# Patient Record
Sex: FEMALE | Race: WHITE | NOT HISPANIC OR LATINO | Employment: OTHER | URBAN - METROPOLITAN AREA
[De-identification: names, ages, dates, MRNs, and addresses within clinical notes are randomized per-mention and may not be internally consistent; named-entity substitution may affect disease eponyms.]

---

## 2023-06-26 ENCOUNTER — HOSPITAL ENCOUNTER (EMERGENCY)
Facility: CLINIC | Age: 63
Discharge: HOME | End: 2023-06-27
Attending: EMERGENCY MEDICINE | Admitting: EMERGENCY MEDICINE
Payer: COMMERCIAL

## 2023-06-26 ENCOUNTER — APPOINTMENT (OUTPATIENT)
Dept: MRI IMAGING | Facility: CLINIC | Age: 63
End: 2023-06-26
Attending: EMERGENCY MEDICINE
Payer: COMMERCIAL

## 2023-06-26 DIAGNOSIS — S22.050A COMPRESSION FRACTURE OF T6 VERTEBRA, INITIAL ENCOUNTER (H): ICD-10-CM

## 2023-06-26 DIAGNOSIS — S32.010A CLOSED COMPRESSION FRACTURE OF L1 LUMBAR VERTEBRA, INITIAL ENCOUNTER (H): ICD-10-CM

## 2023-06-26 PROCEDURE — 72146 MRI CHEST SPINE W/O DYE: CPT

## 2023-06-26 PROCEDURE — 99284 EMERGENCY DEPT VISIT MOD MDM: CPT | Mod: 25

## 2023-06-26 PROCEDURE — 72148 MRI LUMBAR SPINE W/O DYE: CPT

## 2023-06-26 PROCEDURE — 250N000013 HC RX MED GY IP 250 OP 250 PS 637: Performed by: EMERGENCY MEDICINE

## 2023-06-26 RX ORDER — ONDANSETRON 2 MG/ML
4 INJECTION INTRAMUSCULAR; INTRAVENOUS EVERY 30 MIN PRN
Status: DISCONTINUED | OUTPATIENT
Start: 2023-06-26 | End: 2023-06-27 | Stop reason: HOSPADM

## 2023-06-26 RX ORDER — OXYCODONE HYDROCHLORIDE 5 MG/1
10 TABLET ORAL ONCE
Status: COMPLETED | OUTPATIENT
Start: 2023-06-26 | End: 2023-06-26

## 2023-06-26 RX ADMIN — OXYCODONE HYDROCHLORIDE 10 MG: 5 TABLET ORAL at 21:55

## 2023-06-26 ASSESSMENT — ACTIVITIES OF DAILY LIVING (ADL): ADLS_ACUITY_SCORE: 35

## 2023-06-26 ASSESSMENT — ENCOUNTER SYMPTOMS
NUMBNESS: 0
BACK PAIN: 1

## 2023-06-27 VITALS
RESPIRATION RATE: 16 BRPM | DIASTOLIC BLOOD PRESSURE: 81 MMHG | OXYGEN SATURATION: 97 % | WEIGHT: 203 LBS | HEART RATE: 65 BPM | SYSTOLIC BLOOD PRESSURE: 145 MMHG | HEIGHT: 64 IN | BODY MASS INDEX: 34.66 KG/M2 | TEMPERATURE: 98 F

## 2023-06-27 PROCEDURE — 96372 THER/PROPH/DIAG INJ SC/IM: CPT | Performed by: EMERGENCY MEDICINE

## 2023-06-27 PROCEDURE — 250N000011 HC RX IP 250 OP 636: Performed by: EMERGENCY MEDICINE

## 2023-06-27 RX ORDER — HYDROMORPHONE HYDROCHLORIDE 1 MG/ML
0.5 INJECTION, SOLUTION INTRAMUSCULAR; INTRAVENOUS; SUBCUTANEOUS ONCE
Status: COMPLETED | OUTPATIENT
Start: 2023-06-27 | End: 2023-06-27

## 2023-06-27 RX ORDER — ONDANSETRON 4 MG/1
4 TABLET, ORALLY DISINTEGRATING ORAL EVERY 8 HOURS PRN
Qty: 10 TABLET | Refills: 0 | Status: SHIPPED | OUTPATIENT
Start: 2023-06-27

## 2023-06-27 RX ORDER — SENNA AND DOCUSATE SODIUM 50; 8.6 MG/1; MG/1
1 TABLET, FILM COATED ORAL 2 TIMES DAILY PRN
Qty: 20 TABLET | Refills: 0 | Status: ON HOLD | OUTPATIENT
Start: 2023-06-27 | End: 2024-06-14

## 2023-06-27 RX ORDER — ONDANSETRON 4 MG/1
4 TABLET, ORALLY DISINTEGRATING ORAL ONCE
Status: COMPLETED | OUTPATIENT
Start: 2023-06-27 | End: 2023-06-27

## 2023-06-27 RX ORDER — OXYCODONE HYDROCHLORIDE 5 MG/1
5 TABLET ORAL EVERY 6 HOURS PRN
Qty: 16 TABLET | Refills: 0 | Status: SHIPPED | OUTPATIENT
Start: 2023-06-27 | End: 2023-06-30

## 2023-06-27 RX ADMIN — ONDANSETRON 4 MG: 4 TABLET, ORALLY DISINTEGRATING ORAL at 00:42

## 2023-06-27 RX ADMIN — HYDROMORPHONE HYDROCHLORIDE 0.5 MG: 1 INJECTION, SOLUTION INTRAMUSCULAR; INTRAVENOUS; SUBCUTANEOUS at 00:42

## 2023-06-27 ASSESSMENT — ACTIVITIES OF DAILY LIVING (ADL): ADLS_ACUITY_SCORE: 35

## 2023-06-27 NOTE — ED NOTES
Started Thursday morning while helping to transfer disabled sister.  Patient heard and felt a pop in her lower back.  She iced it, took aleve, and put on an antiinflammatory gel and rested.  Low back is now feels ok but the pain has migrated up her back,  She did take baclofen at 1500 today and it did relieve some pain.  Patient states she has osteopenia

## 2023-06-27 NOTE — ED TRIAGE NOTES
Patient reports pain in Thoracic area of her back, pain radiates across shoulders. Reports transferring a person out of a wheel chair on 6/22 and immediately left low back pain. On 6/24 woke up with upper back pain that has progressively gotten worse. Has been taking aleve and using ice. Took a muscle relaxer this evening which helped until her sneezed.      Triage Assessment     Row Name 06/26/23 2003       Triage Assessment (Adult)    Airway WDL WDL       Respiratory WDL    Respiratory WDL WDL       Skin Circulation/Temperature WDL    Skin Circulation/Temperature WDL WDL       Cardiac WDL    Cardiac WDL WDL       Peripheral/Neurovascular WDL    Peripheral Neurovascular WDL WDL       Cognitive/Neuro/Behavioral WDL    Cognitive/Neuro/Behavioral WDL WDL

## 2023-08-05 ENCOUNTER — HEALTH MAINTENANCE LETTER (OUTPATIENT)
Age: 63
End: 2023-08-05

## 2024-06-09 ENCOUNTER — APPOINTMENT (OUTPATIENT)
Dept: ULTRASOUND IMAGING | Facility: CLINIC | Age: 64
End: 2024-06-09
Attending: EMERGENCY MEDICINE
Payer: COMMERCIAL

## 2024-06-09 ENCOUNTER — HOSPITAL ENCOUNTER (OUTPATIENT)
Facility: HOSPITAL | Age: 64
End: 2024-06-09
Attending: SURGERY | Admitting: SURGERY
Payer: COMMERCIAL

## 2024-06-09 ENCOUNTER — HOSPITAL ENCOUNTER (EMERGENCY)
Facility: CLINIC | Age: 64
Discharge: HOME | End: 2024-06-09
Attending: EMERGENCY MEDICINE | Admitting: EMERGENCY MEDICINE
Payer: COMMERCIAL

## 2024-06-09 VITALS
WEIGHT: 176 LBS | TEMPERATURE: 97.8 F | HEIGHT: 64 IN | DIASTOLIC BLOOD PRESSURE: 68 MMHG | SYSTOLIC BLOOD PRESSURE: 129 MMHG | RESPIRATION RATE: 18 BRPM | OXYGEN SATURATION: 97 % | BODY MASS INDEX: 30.05 KG/M2 | HEART RATE: 62 BPM

## 2024-06-09 DIAGNOSIS — K80.20 SYMPTOMATIC CHOLELITHIASIS: ICD-10-CM

## 2024-06-09 DIAGNOSIS — K81.9 CHOLECYSTITIS: Primary | ICD-10-CM

## 2024-06-09 LAB
ALBUMIN SERPL BCG-MCNC: 4.2 G/DL (ref 3.5–5.2)
ALBUMIN UR-MCNC: NEGATIVE MG/DL
ALP SERPL-CCNC: 93 U/L (ref 40–150)
ALT SERPL W P-5'-P-CCNC: 26 U/L (ref 0–50)
ANION GAP SERPL CALCULATED.3IONS-SCNC: 12 MMOL/L (ref 7–15)
APPEARANCE UR: CLEAR
AST SERPL W P-5'-P-CCNC: 22 U/L (ref 0–45)
BASOPHILS # BLD AUTO: 0 10E3/UL (ref 0–0.2)
BASOPHILS NFR BLD AUTO: 0 %
BILIRUB SERPL-MCNC: 0.4 MG/DL
BILIRUB UR QL STRIP: NEGATIVE
BUN SERPL-MCNC: 10.5 MG/DL (ref 8–23)
CALCIUM SERPL-MCNC: 9.1 MG/DL (ref 8.8–10.2)
CHLORIDE SERPL-SCNC: 104 MMOL/L (ref 98–107)
COLOR UR AUTO: ABNORMAL
CREAT SERPL-MCNC: 0.78 MG/DL (ref 0.51–0.95)
DEPRECATED HCO3 PLAS-SCNC: 23 MMOL/L (ref 22–29)
EGFRCR SERPLBLD CKD-EPI 2021: 85 ML/MIN/1.73M2
EOSINOPHIL # BLD AUTO: 0.1 10E3/UL (ref 0–0.7)
EOSINOPHIL NFR BLD AUTO: 1 %
ERYTHROCYTE [DISTWIDTH] IN BLOOD BY AUTOMATED COUNT: 12.2 % (ref 10–15)
GLUCOSE SERPL-MCNC: 103 MG/DL (ref 70–99)
GLUCOSE UR STRIP-MCNC: NEGATIVE MG/DL
HCT VFR BLD AUTO: 38.1 % (ref 35–47)
HGB BLD-MCNC: 13.2 G/DL (ref 11.7–15.7)
HGB UR QL STRIP: NEGATIVE
IMM GRANULOCYTES # BLD: 0 10E3/UL
IMM GRANULOCYTES NFR BLD: 0 %
KETONES UR STRIP-MCNC: NEGATIVE MG/DL
LEUKOCYTE ESTERASE UR QL STRIP: NEGATIVE
LIPASE SERPL-CCNC: 36 U/L (ref 13–60)
LYMPHOCYTES # BLD AUTO: 2.1 10E3/UL (ref 0.8–5.3)
LYMPHOCYTES NFR BLD AUTO: 20 %
MCH RBC QN AUTO: 30.1 PG (ref 26.5–33)
MCHC RBC AUTO-ENTMCNC: 34.6 G/DL (ref 31.5–36.5)
MCV RBC AUTO: 87 FL (ref 78–100)
MONOCYTES # BLD AUTO: 0.9 10E3/UL (ref 0–1.3)
MONOCYTES NFR BLD AUTO: 9 %
MUCOUS THREADS #/AREA URNS LPF: PRESENT /LPF
NEUTROPHILS # BLD AUTO: 7.5 10E3/UL (ref 1.6–8.3)
NEUTROPHILS NFR BLD AUTO: 71 %
NITRATE UR QL: NEGATIVE
NRBC # BLD AUTO: 0 10E3/UL
NRBC BLD AUTO-RTO: 0 /100
PH UR STRIP: 5 [PH] (ref 5–7)
PLATELET # BLD AUTO: 288 10E3/UL (ref 150–450)
POTASSIUM SERPL-SCNC: 3.9 MMOL/L (ref 3.4–5.3)
PROT SERPL-MCNC: 7 G/DL (ref 6.4–8.3)
RBC # BLD AUTO: 4.39 10E6/UL (ref 3.8–5.2)
RBC URINE: 0 /HPF
SODIUM SERPL-SCNC: 139 MMOL/L (ref 135–145)
SP GR UR STRIP: 1.01 (ref 1–1.03)
SQUAMOUS EPITHELIAL: <1 /HPF
UROBILINOGEN UR STRIP-MCNC: <2 MG/DL
WBC # BLD AUTO: 10.7 10E3/UL (ref 4–11)
WBC URINE: <1 /HPF

## 2024-06-09 PROCEDURE — 96361 HYDRATE IV INFUSION ADD-ON: CPT

## 2024-06-09 PROCEDURE — 99285 EMERGENCY DEPT VISIT HI MDM: CPT | Mod: 25

## 2024-06-09 PROCEDURE — 96375 TX/PRO/DX INJ NEW DRUG ADDON: CPT

## 2024-06-09 PROCEDURE — 85025 COMPLETE CBC W/AUTO DIFF WBC: CPT | Performed by: EMERGENCY MEDICINE

## 2024-06-09 PROCEDURE — 81001 URINALYSIS AUTO W/SCOPE: CPT | Performed by: EMERGENCY MEDICINE

## 2024-06-09 PROCEDURE — 250N000011 HC RX IP 250 OP 636: Mod: JZ | Performed by: EMERGENCY MEDICINE

## 2024-06-09 PROCEDURE — 96374 THER/PROPH/DIAG INJ IV PUSH: CPT

## 2024-06-09 PROCEDURE — 36415 COLL VENOUS BLD VENIPUNCTURE: CPT | Performed by: EMERGENCY MEDICINE

## 2024-06-09 PROCEDURE — 80053 COMPREHEN METABOLIC PANEL: CPT | Performed by: EMERGENCY MEDICINE

## 2024-06-09 PROCEDURE — 76705 ECHO EXAM OF ABDOMEN: CPT

## 2024-06-09 PROCEDURE — 258N000003 HC RX IP 258 OP 636: Mod: JZ | Performed by: EMERGENCY MEDICINE

## 2024-06-09 PROCEDURE — 83690 ASSAY OF LIPASE: CPT | Performed by: EMERGENCY MEDICINE

## 2024-06-09 RX ORDER — OXYCODONE HYDROCHLORIDE 5 MG/1
5 TABLET ORAL EVERY 6 HOURS PRN
Qty: 12 TABLET | Refills: 0 | Status: SHIPPED | OUTPATIENT
Start: 2024-06-09 | End: 2024-06-12

## 2024-06-09 RX ORDER — HYDROMORPHONE HYDROCHLORIDE 1 MG/ML
0.5 INJECTION, SOLUTION INTRAMUSCULAR; INTRAVENOUS; SUBCUTANEOUS ONCE
Status: COMPLETED | OUTPATIENT
Start: 2024-06-09 | End: 2024-06-09

## 2024-06-09 RX ORDER — SODIUM CHLORIDE, SODIUM LACTATE, POTASSIUM CHLORIDE, CALCIUM CHLORIDE 600; 310; 30; 20 MG/100ML; MG/100ML; MG/100ML; MG/100ML
INJECTION, SOLUTION INTRAVENOUS CONTINUOUS
Status: CANCELLED | OUTPATIENT
Start: 2024-06-10

## 2024-06-09 RX ORDER — ONDANSETRON 2 MG/ML
4 INJECTION INTRAMUSCULAR; INTRAVENOUS ONCE
Status: COMPLETED | OUTPATIENT
Start: 2024-06-09 | End: 2024-06-09

## 2024-06-09 RX ORDER — CEFAZOLIN SODIUM/WATER 2 G/20 ML
2 SYRINGE (ML) INTRAVENOUS SEE ADMIN INSTRUCTIONS
Status: CANCELLED | OUTPATIENT
Start: 2024-06-10

## 2024-06-09 RX ORDER — ACETAMINOPHEN 325 MG/1
975 TABLET ORAL ONCE
Status: CANCELLED | OUTPATIENT
Start: 2024-06-10 | End: 2024-06-09

## 2024-06-09 RX ORDER — INDOCYANINE GREEN AND WATER 25 MG
2.5 KIT INJECTION ONCE
Status: CANCELLED | OUTPATIENT
Start: 2024-06-09 | End: 2024-06-09

## 2024-06-09 RX ORDER — MAGNESIUM SULFATE 4 G/50ML
4 INJECTION INTRAVENOUS ONCE
Status: CANCELLED | OUTPATIENT
Start: 2024-06-10 | End: 2024-06-09

## 2024-06-09 RX ORDER — ONDANSETRON 4 MG/1
4 TABLET, ORALLY DISINTEGRATING ORAL EVERY 8 HOURS PRN
Qty: 10 TABLET | Refills: 0 | Status: SHIPPED | OUTPATIENT
Start: 2024-06-09 | End: 2024-06-12

## 2024-06-09 RX ORDER — CEFAZOLIN SODIUM/WATER 2 G/20 ML
2 SYRINGE (ML) INTRAVENOUS
Status: CANCELLED | OUTPATIENT
Start: 2024-06-10

## 2024-06-09 RX ORDER — LIDOCAINE 40 MG/G
CREAM TOPICAL
Status: CANCELLED | OUTPATIENT
Start: 2024-06-09

## 2024-06-09 RX ADMIN — HYDROMORPHONE HYDROCHLORIDE 0.5 MG: 1 INJECTION, SOLUTION INTRAMUSCULAR; INTRAVENOUS; SUBCUTANEOUS at 11:07

## 2024-06-09 RX ADMIN — ONDANSETRON 4 MG: 2 INJECTION INTRAMUSCULAR; INTRAVENOUS at 11:05

## 2024-06-09 RX ADMIN — SODIUM CHLORIDE 1000 ML: 9 INJECTION, SOLUTION INTRAVENOUS at 11:03

## 2024-06-09 ASSESSMENT — ACTIVITIES OF DAILY LIVING (ADL)
ADLS_ACUITY_SCORE: 35

## 2024-06-09 ASSESSMENT — COLUMBIA-SUICIDE SEVERITY RATING SCALE - C-SSRS
6. HAVE YOU EVER DONE ANYTHING, STARTED TO DO ANYTHING, OR PREPARED TO DO ANYTHING TO END YOUR LIFE?: NO
2. HAVE YOU ACTUALLY HAD ANY THOUGHTS OF KILLING YOURSELF IN THE PAST MONTH?: NO
1. IN THE PAST MONTH, HAVE YOU WISHED YOU WERE DEAD OR WISHED YOU COULD GO TO SLEEP AND NOT WAKE UP?: NO

## 2024-06-09 NOTE — ED PROVIDER NOTES
EMERGENCY DEPARTMENT ENCOUNTER      NAME: Maddy Torres  AGE: 64 year old female  YOB: 1960  MRN: [id number]  EVALUATION DATE & TIME: 6/9/2024 10:17 AM    PCP: Lara Valentin    ED PROVIDER: Valerie Lynch MD      Chief Complaint   Patient presents with    Abdominal Pain         FINAL IMPRESSION:  1. Symptomatic cholelithiasis          ED COURSE & MEDICAL DECISION MAKING:    Pertinent Labs & Imaging studies reviewed. (See chart for details)    10:54 AM I introduced myself to the patient, obtained patient history, performed a physical exam, and discussed plan for ED workup including potential diagnostic laboratory/imaging studies and interventions.    64 year old female who presents to the Emergency Department for evaluation of right upper quadrant pain with associated nausea that was worse after eating a fatty and greasy meal.  Originally happened about 5 days ago and was severe and then improved somewhat throughout the week but was never totally gone.  She then developed severe pain again after eating a fatty greasy meal last night and it persisted into today where she came for evaluation.  Patient went to urgent care where she was given IM Toradol and sent here for further evaluation.  On exam here, she does have tenderness in the right upper quadrant with a little bit of voluntary guarding and a positive Ingram sign.  Otherwise no actual sign of generalized peritonitis.  Her vital signs here are within normal limits and she is afebrile.  She does appear uncomfortable and was given 0.5 mg of IV Dilaudid as well as 4 mg of IV Zofran.  Also given a liter of fluids.  With this her pain was much improved.  Did have concern for gallbladder pathology with her presentation.  Also considered pancreatitis, GERD, ulcer, less likely bowel obstruction as she is having bowel movements, kidney stone, UTI, etc.  Laboratory studies obtained.  CMP is unremarkable with normal alk phos, AST, ALT, and  bilirubin.  Kidney function within normal limits.  Lipase within normal limits making pancreatitis unlikely.  Urinalysis without sign of UTI or hematuria.  CBC reveals white blood cell count of 10.7.  Hemoglobin 13.2.    Right upper quadrant ultrasound reveals cholelithiasis with no evidence of acute cholecystitis.  There is a positive sonographic Ingram sign.  However gallbladder wall within normal limits and no pericholecystic fluid.  No biliary dilatation per the scan.  Hepatic steatosis.  Do feel that she has symptomatic cholelithiasis as the cause of her symptoms.  On reevaluation she reports her pain is significantly improved.  Her tenderness is also significantly improved.  Discussed the case with Dr. Ferrari with general surgery who offered her 2 options either being admitted overnight as unfortunately he has a critically ill patient with a long OR case tonight and would not be able to remove her gallbladder tonight versus close outpatient follow-up and gallbladder removal this week as does not require emergent removal today.  Patient would very much like to be discharged as she is concerned about insurance coverage of a hospital admission versus outpatient surgery.  I contacted Dr. Ferrari again with general surgery and he was actually able to schedule a cholecystectomy tomorrow morning at 7:30 AM at [facility].  Patient was in agreement with this plan.  We discussed that she needs arrive at 5:30 AM tomorrow and that she needs to be n.p.o. after midnight.  Did write her a short prescription for oxycodone as needed for severe pain as well as Zofran as needed for nausea.  We discussed where she should present tomorrow morning and to tell them that she was getting surgery with Dr. Ferrari.  She was in agreement with this plan.  She was given strict return precautions to the ER.  She voiced understanding.  She was discharged home in stable condition.    ED Course as of 06/09/24 5538   [place] Jun 09, 2024   1302 I  talked with Dr. Ferrari, surgery.   1308 I rechecked and updated the patient.    1321 I talked with Dr. Ferrari, surgery. They are putting her on the schedule for tomorrow.        At the conclusion of the encounter I discussed the results of all of the tests and the disposition. The questions were answered. The patient or family acknowledged understanding and was agreeable with the care plan.     Medical Decision Making  Obtained supplemental history:Supplemental history obtained?: No  Reviewed external records: External records reviewed?: Documented in chart  Care impacted by chronic illness:Hyperlipidemia  Care significantly affected by social determinants of health:N/A  Did you consider but not order tests?: Work up considered but not performed and documented in chart, if applicable  Did you interpret images independently?: Independent interpretation of ECG and images noted in documentation, when applicable.  Consultation discussion with other provider:Did you involve another provider (consultant, MH, pharmacy, etc.)?: I discussed the care with another health care provider, see documentation for details.  Discharge. I prescribed additional prescription strength medication(s) as charted. See documentation for any additional details.      MEDICATIONS GIVEN IN THE EMERGENCY:  Medications   HYDROmorphone (PF) (DILAUDID) injection 0.5 mg (0.5 mg Intravenous $Given 6/9/24 1107)   ondansetron (ZOFRAN) injection 4 mg (4 mg Intravenous $Given 6/9/24 1105)   sodium chloride 0.9% BOLUS 1,000 mL (0 mLs Intravenous Stopped 6/9/24 1215)       NEW PRESCRIPTIONS STARTED AT TODAY'S ER VISIT  Discharge Medication List as of 6/9/2024  1:30 PM        START taking these medications    Details   !! ondansetron (ZOFRAN ODT) 4 MG ODT tab Take 1 tablet (4 mg) by mouth every 8 hours as needed for vomiting or nausea, Disp-10 tablet, R-0, Local Print      oxyCODONE (ROXICODONE) 5 MG tablet Take 1 tablet (5 mg) by mouth every 6 hours as needed for  "pain, Disp-12 tablet, R-0, Local Print       !! - Potential duplicate medications found. Please discuss with provider.             =================================================================    HPI    Patient information was obtained from: patient    Use of : N/A         Maddy Torres is a 64 year old female with a pertinent history of hyperlipidemia, and endometriosis who presents to this ED for evaluation of RUQ abdominal pain.    Patient reports that on 6/4/2024, she was doing yard work where she first developed back pain and then during the night, she developed stabbing right upper quadrant abdominal pain that progressively worsened through the night. Says the right upper quadrant abdominal pain radiates to her back and into the left shoulder. She also had an episode of vomiting. Says that she \"felt hot\" when vomiting but no documented fever. The pain then settled down but was still present throughout the week. Says at dinner last night, she ate spicy steak bites and mini doughnuts. At 0200 this morning, the right upper quadrant abdominal pain came back and was excruciating. She did not vomit during this time but did become very nauseated. She has also felt constipated and her last bowel movement was this morning where she produced only a little bit of stool. Breathing causes pain to the RUQ abdomen. Denies ulcers, acid reflux, blood in stool or vomit, urinary symptoms, history of kidney stones, chest pain, shortness of breath, and cough.     She reports she was on monitor Ruben but has not taken the injection for 3 weeks.  She was on this for weight loss.    Per chart review, patient was seen at [facility] earlier today (6/9/2024) by an NP for evaluation of RUQ abdominal pain. She was given Toradol 30 mg. She was sent here for further evaluation.       REVIEW OF SYSTEMS   Pertinent positives and negatives are documented in the HPI. All other systems reviewed and are " negative.      PAST MEDICAL HISTORY:  Past Medical History:   Diagnosis Date    Endometriosis     Hyperlipaemia     Hypothyroid     Insomnia     Malaise and fatigue     Obesity     PONV (postoperative nausea and vomiting)     Vitamin D deficiency        PAST SURGICAL HISTORY:  Past Surgical History:   Procedure Laterality Date    COLONOSCOPY      LAPAROSCOPY,PELVIC,BIOPSY      X 2    OPERATIVE HYSTEROSCOPY WITH MORCELLATOR  12/17/2013    Procedure: OPERATIVE HYSTEROSCOPY WITH MORCELLATOR;  OPERATIVE HYSTEROSCOPY, POLYPECTOMY WITH MONTILLA AND NEPHEW 5.0 MM MORCELLATOR;  Surgeon: Jessica Johnston MD;  Location: [facility]    WISDOM TEETH[             CURRENT MEDICATIONS:    ondansetron (ZOFRAN ODT) 4 MG ODT tab  oxyCODONE (ROXICODONE) 5 MG tablet  ALPRAZolam (XANAX PO)  Atorvastatin Calcium (LIPITOR PO)  calcium-vitamin D (CALTRATE) 600-400 MG-UNIT per tablet  Cetirizine HCl (ZYRTEC PO)  Cholecalciferol (VITAMIN D3 PO)  fluticasone (FLONASE) 50 MCG/ACT nasal spray  ibuprofen (ADVIL,MOTRIN) 400 MG tablet  MELATONIN PO  Multiple Vitamin (MULTI-VITAMIN DAILY PO)  ondansetron (ZOFRAN ODT) 4 MG ODT tab  oxyCODONE-acetaminophen (PERCOCET) 5-325 MG per tablet  SENNA-docusate sodium (SENNA S) 8.6-50 MG tablet  Zolpidem Tartrate (AMBIEN PO)        ALLERGIES:  Allergies   Allergen Reactions    Cortisone Anxiety, Headache, Other (See Comments), Rash and Shortness Of Breath     Agitation after fasciitis injection.    Other Drug Allergy (See Comments) Headache, Other (See Comments) and Shortness Of Breath       FAMILY HISTORY:  History reviewed. No pertinent family history.    SOCIAL HISTORY:   Social History     Socioeconomic History    Marital status:    Tobacco Use    Smoking status: Never   Substance and Sexual Activity    Alcohol use: Yes     Comment: SOCIALLY     Social Determinants of Health     Financial Resource Strain: Low Risk  (7/4/2023)    Received from [facility]    Financial  "Resource Strain     Difficulty of Paying Living Expenses: 3   Food Insecurity: No Food Insecurity (7/4/2023)    Received from [facility]    Food Insecurity     Worried About Running Out of Food in the Last Year: 1   Transportation Needs: No Transportation Needs (7/4/2023)    Received from [facility]    Transportation Needs     Lack of Transportation (Medical): 1   Physical Activity: Insufficiently Active (6/9/2022)    Received from [facility]    Exercise Vital Sign     Days of Exercise per Week: 4 days     Minutes of Exercise per Session: 30 min   Stress: No Stress Concern Present (6/9/2022)    Received from [facility]    [employer] [place] - Occupational Stress Questionnaire     Feeling of Stress : Only a little   Social Connections: Socially Integrated (7/4/2023)    Received from [facility]    Social Connections     Frequency of Communication with Friends and Family: 0   Interpersonal Safety: Not At Risk (6/9/2022)    Received from [facility]    Humiliation, Afraid, Rape, and Kick questionnaire     Fear of Current or Ex-Partner: No     Emotionally Abused: No     Physically Abused: No     Sexually Abused: No   Housing Stability: Low Risk  (7/4/2023)    Received from [facility]    Housing Stability     Unable to Pay for Housing in the Last Year: 1       VITALS:  /68   Pulse 62   Temp 97.8  F (36.6  C) (Temporal)   Resp 18   Ht 1.626 m (5' 4\")   Wt 79.8 kg (176 lb)   SpO2 97%   BMI 30.21 kg/m      PHYSICAL EXAM    Physical Exam  Constitutional: Well developed, Well nourished, NAD, GCS 15  HENT: Normocephalic, Atraumatic, Bilateral external ears normal, Oropharynx normal, mucous membranes moist, Nose normal. Neck-  Normal range of motion, No tenderness, Supple, No stridor.    Eyes: PERRL, EOMI, Conjunctiva normal, No discharge.   Respiratory: " Normal breath sounds, No respiratory distress, No wheezing or crackles, Speaks in full sentences easily.    Cardiovascular: Normal heart rate, Regular rhythm, No murmurs, No rubs, No gallops. 2+ radial pulses bilaterally  GI: Bowel sounds normal, Soft, RUQ tenderness with positive ingram's sign, no other abdominal tenderness on exam. No masses, Some voluntary guarding. No rebound. No rigidity.   Musculoskeletal: 2+ DP pulses. No notable lower extremity edema. Good range of motion in all major joints.   Integument: Warm, Dry, No erythema, No rash. No petechiae.   Neurologic: Alert & oriented x 3, 5/5 strength in all 4 extremities bilaterally. Sensation intact to light touch in all 4 extremities and the face bilaterally. No focal deficits noted.   Psychiatric: Affect normal, Judgment normal, Mood normal. Cooperative.      LAB:  All pertinent labs reviewed and interpreted.  Results for orders placed or performed during the hospital encounter of 06/09/24   US Abdomen Limited    Impression    IMPRESSION:  1.  Cholelithiasis with no evidence of acute cholecystitis. Sonographic Ingram sign is positive. Could consider HIDA scan if indicated.  2.  Hepatic steatosis.       Comprehensive metabolic panel   Result Value Ref Range    Sodium 139 135 - 145 mmol/L    Potassium 3.9 3.4 - 5.3 mmol/L    Carbon Dioxide (CO2) 23 22 - 29 mmol/L    Anion Gap 12 7 - 15 mmol/L    Urea Nitrogen 10.5 8.0 - 23.0 mg/dL    Creatinine 0.78 0.51 - 0.95 mg/dL    GFR Estimate 85 >60 mL/min/1.73m2    Calcium 9.1 8.8 - 10.2 mg/dL    Chloride 104 98 - 107 mmol/L    Glucose 103 (H) 70 - 99 mg/dL    Alkaline Phosphatase 93 40 - 150 U/L    AST 22 0 - 45 U/L    ALT 26 0 - 50 U/L    Protein Total 7.0 6.4 - 8.3 g/dL    Albumin 4.2 3.5 - 5.2 g/dL    Bilirubin Total 0.4 <=1.2 mg/dL   Result Value Ref Range    Lipase 36 13 - 60 U/L   UA with Microscopic reflex to Culture    Specimen: Urine, Clean Catch   Result Value Ref Range    Color Urine Light Yellow  Colorless, Straw, Light Yellow, Yellow    Appearance Urine Clear Clear    Glucose Urine Negative Negative mg/dL    Bilirubin Urine Negative Negative    Ketones Urine Negative Negative mg/dL    Specific Gravity Urine 1.007 1.001 - 1.030    Blood Urine Negative Negative    pH Urine 5.0 5.0 - 7.0    Protein Albumin Urine Negative Negative mg/dL    Urobilinogen Urine <2.0 <2.0 mg/dL    Nitrite Urine Negative Negative    Leukocyte Esterase Urine Negative Negative    Mucus Urine Present (A) None Seen /LPF    RBC Urine 0 <=2 /HPF    WBC Urine <1 <=5 /HPF    Squamous Epithelials Urine <1 <=1 /HPF   CBC with platelets and differential   Result Value Ref Range    WBC Count 10.7 4.0 - 11.0 10e3/uL    RBC Count 4.39 3.80 - 5.20 10e6/uL    Hemoglobin 13.2 11.7 - 15.7 g/dL    Hematocrit 38.1 35.0 - 47.0 %    MCV 87 78 - 100 fL    MCH 30.1 26.5 - 33.0 pg    MCHC 34.6 31.5 - 36.5 g/dL    RDW 12.2 10.0 - 15.0 %    Platelet Count 288 150 - 450 10e3/uL    % Neutrophils 71 %    % Lymphocytes 20 %    % Monocytes 9 %    % Eosinophils 1 %    % Basophils 0 %    % Immature Granulocytes 0 %    NRBCs per 100 WBC 0 <1 /100    Absolute Neutrophils 7.5 1.6 - 8.3 10e3/uL    Absolute Lymphocytes 2.1 0.8 - 5.3 10e3/uL    Absolute Monocytes 0.9 0.0 - 1.3 10e3/uL    Absolute Eosinophils 0.1 0.0 - 0.7 10e3/uL    Absolute Basophils 0.0 0.0 - 0.2 10e3/uL    Absolute Immature Granulocytes 0.0 <=0.4 10e3/uL    Absolute NRBCs 0.0 10e3/uL       RADIOLOGY:  Reviewed all pertinent imaging. Please see official radiology report.  US Abdomen Limited   Final Result   IMPRESSION:   1.  Cholelithiasis with no evidence of acute cholecystitis. Sonographic Ingrma sign is positive. Could consider HIDA scan if indicated.   2.  Hepatic steatosis.                  [facility] [employer] Documentation:   CMS Diagnoses:               Claudine PERDUE, am serving as a scribe to document services personally performed by Valerie Lynch MD based on my observation and the  provider's statements to me. I, Valerie Lynch MD, attest that Claudine Hong is acting in a scribe capacity, has observed my performance of the services and has documented them in accordance with my direction.    Valerie Lynch MD  [facility] EMERGENCY ROOM  [address] [facility] [id number] [phone]      Valerie Lynch MD  06/09/24 6136

## 2024-06-09 NOTE — PROGRESS NOTES
Pt presenting with abdominal pain and found to have gallstones with positive turk's sign.  Pt still with abdominal pain.     Plan:   - Can discharge to home from ED  - Pt is to follow up at [facility] on 6/10 at 545 am for same day surgery for robotic cholecystectomy. Scheduled surgery time is 730 am.   - Pt is to be NPO at [facility].   - Dr. Ferrari will do HnP day of surgery.     Raul Ferrari DO  General Surgeon  [facility]  [facility] - [address] [address]?  Office: [phone]  Employed by - [facility] [employer]  Pager: [phone]

## 2024-06-09 NOTE — DISCHARGE INSTRUCTIONS
As we discussed, you are scheduled for surgery to remove your gallbladder tomorrow morning at 7:30 AM at [facility].  You should arrive there at 5:30 AM.  Please do not eat or drink anything by mouth after midnight tonight.  You will be having surgery with Dr. Raul Ferrari.  You can go to the hospital entrance when you arrive and they will bring you to the preop area just tell them you are having surgery with Dr. Ferrari.    Can take the oxycodone as needed for severe pain.  Can take Zofran as needed for nausea.    Return to the ER for any new or worsening concerns.

## 2024-06-09 NOTE — ED NOTES
Expected Patient Referral to ED  9:49 AM    Referring Clinic/Provider:  NP at urgent care    Reason for referral/Clinical facts:  RUQ since Tuesday, vomiting, pain radiating to the back, concerned for gallbladder pathology and no imaging there    Recommendations provided:  Send to ED for further evaluation    Caller was informed that this institution does possess the capabilities and/or resources to provide for patient and should be transferred to our facility.    Discussed that if direct admit is sought and any hurdles are encountered, this ED would be happy to see the patient and evaluate.    Informed caller that recommendations provided are recommendations based only on the facts provided and that they responsible to accept or reject the advice, or to seek a formal in person consultation as needed and that this ED will see/treat patient should they arrive.      Valerie Lynch MD  [facility] EMERGENCY ROOM  [address] [place] [id number]  [phone]       Valerie Lynch MD  06/09/24 0951

## 2024-06-09 NOTE — ED TRIAGE NOTES
Pt sent from urgent care stared having RUQ abdominal pain Tuesday pain radiates to right mid back and left shoulder pain, sent here for gallbladder eval. Pt having nausea and vomiting, eating makes it worse.   States Tuesday have fever not since, given IM Toradol at urgent care.

## 2024-06-10 ENCOUNTER — TELEPHONE (OUTPATIENT)
Dept: SURGERY | Facility: CLINIC | Age: 64
End: 2024-06-10
Payer: COMMERCIAL

## 2024-06-10 NOTE — LETTER
Pre-op Physical: To be done by Dr Ferrari day of procedure.    Surgery Date: 6/14/2024     Location: [place] [address]    Approximate Arrival Time: 6:00 am  (Unless instructed differently by the pre-op call nurse)     Post op Appointment: Will receive telephone call from RN 3-5 days post procedure.    Pre-Surgical Tasks:     Review all medications with your primary care or prescribing physician; they will advise you which meds to stop and when, and when you can resume taking.  Certain medications like blood thinners and weight loss medications need to be stopped in advance of surgery to proceed safely.      Blood thinners including but not exclusive to drugs like Xarelto, Eliquis, Warfarin and Aspirin, should be stopped five days before surgery, if your prescribing provider agrees. Follow your provider's advice on stopping blood thinners because they know you best.  If you are unsure if your medication is a blood thinner, ask your prescribing provider.    Weight loss medications: There are multiple medications being used for weight management and diabetes today, and the list is growing.  Phentermine, Ozempic, Wegovy, Trulicity, and other similar medications need to be stopped one week before surgery to avoid being cancelled.  Victoza and Saxenda can be continued longer but must be stopped one full day before surgery.  Please ask your prescribing provider for advice.    Diabetic medications: in addition to the medications talked about above that are used for either weight loss or diabetes, some people are on insulin that may require adjustment.  Please discuss managing diabetic medications with your prescribing doctor as these medications may require modification prior to surgery.     Please shower the evening before and morning of surgery with Hibiclens soap.  Any [facility] can provide this to you at no cost, or it can be found at your local pharmacy.      Fasting instructions will be provided by the pre-op nurse who will call you 1-3 days before surgery.  Typically, we advise normal food up to 8 hours before you arrive for surgery. Clear liquids only from then until 2 hours before you arrive surgery, then nothing at all by mouth.  The nurse will review your specific instructions with you at the call.      Smoking impacts your body's ability to heal properly so we advise patients to quit if possible before surgery.  Plastic Surgery patients are required to be nicotine free for at least 8 weeks before surgery.      You will need an adult to drive you home and stay with you 24 hours after surgery. Public transportation or Medical Van Services are not permitted.    Visitor restrictions are subject to change, please verify with the pre-op nurse when they call how many people are permitted to accompany you.    We always encourage you to notify your insurance any time you have medical tests or procedures scheduled including surgery. The number is usually right on the back of your insurance card. To obtain pricing for surgery, please call [facility] Cost of Care at [phone] or email [email or web address].        Call our office if you have any questions! Thank you!     Edwardo Lewis  Complex   [facility]  [phone]

## 2024-06-10 NOTE — TELEPHONE ENCOUNTER
"Patient calling to inform that she had cancelled her surgery for robotic lisa this morning as she had concerns about insurance coverage.  Patient has cleared the surgery with her insurance at this time and would like to get back on the schedule for the procedure.  Patient stating she is still having intense pain and does not want to wait longer than \"she has to\" for the procedure to be done.      If appropriate, please place orders and I will call her to set up for first available with you.  If patient should not be waiting until first available with you, please advise on how she should proceed and I will call her with your response.    -Edwardo  "

## 2024-06-11 ENCOUNTER — HOSPITAL ENCOUNTER (EMERGENCY)
Facility: CLINIC | Age: 64
Discharge: HOME | End: 2024-06-11
Attending: EMERGENCY MEDICINE | Admitting: EMERGENCY MEDICINE
Payer: COMMERCIAL

## 2024-06-11 ENCOUNTER — APPOINTMENT (OUTPATIENT)
Dept: RADIOLOGY | Facility: CLINIC | Age: 64
End: 2024-06-11
Attending: EMERGENCY MEDICINE
Payer: COMMERCIAL

## 2024-06-11 VITALS
BODY MASS INDEX: 30.05 KG/M2 | HEART RATE: 58 BPM | TEMPERATURE: 98.6 F | HEIGHT: 64 IN | RESPIRATION RATE: 20 BRPM | SYSTOLIC BLOOD PRESSURE: 126 MMHG | WEIGHT: 176 LBS | OXYGEN SATURATION: 94 % | DIASTOLIC BLOOD PRESSURE: 73 MMHG

## 2024-06-11 DIAGNOSIS — K59.00 CONSTIPATION, UNSPECIFIED CONSTIPATION TYPE: ICD-10-CM

## 2024-06-11 DIAGNOSIS — K80.50 BILIARY COLIC: ICD-10-CM

## 2024-06-11 DIAGNOSIS — K80.20 SYMPTOMATIC CHOLELITHIASIS: ICD-10-CM

## 2024-06-11 DIAGNOSIS — N30.00 ACUTE CYSTITIS WITHOUT HEMATURIA: ICD-10-CM

## 2024-06-11 LAB
ALBUMIN SERPL BCG-MCNC: 4 G/DL (ref 3.5–5.2)
ALBUMIN UR-MCNC: 30 MG/DL
ALP SERPL-CCNC: 96 U/L (ref 40–150)
ALT SERPL W P-5'-P-CCNC: 23 U/L (ref 0–50)
ANION GAP SERPL CALCULATED.3IONS-SCNC: 9 MMOL/L (ref 7–15)
APPEARANCE UR: CLEAR
AST SERPL W P-5'-P-CCNC: 21 U/L (ref 0–45)
BASOPHILS # BLD AUTO: 0 10E3/UL (ref 0–0.2)
BASOPHILS NFR BLD AUTO: 0 %
BILIRUB SERPL-MCNC: 0.3 MG/DL
BILIRUB UR QL STRIP: NEGATIVE
BUN SERPL-MCNC: 8.6 MG/DL (ref 8–23)
CALCIUM SERPL-MCNC: 9.3 MG/DL (ref 8.8–10.2)
CHLORIDE SERPL-SCNC: 107 MMOL/L (ref 98–107)
COLOR UR AUTO: YELLOW
CREAT SERPL-MCNC: 0.86 MG/DL (ref 0.51–0.95)
DEPRECATED HCO3 PLAS-SCNC: 24 MMOL/L (ref 22–29)
EGFRCR SERPLBLD CKD-EPI 2021: 75 ML/MIN/1.73M2
EOSINOPHIL # BLD AUTO: 0.1 10E3/UL (ref 0–0.7)
EOSINOPHIL NFR BLD AUTO: 1 %
ERYTHROCYTE [DISTWIDTH] IN BLOOD BY AUTOMATED COUNT: 11.9 % (ref 10–15)
GLUCOSE SERPL-MCNC: 113 MG/DL (ref 70–99)
GLUCOSE UR STRIP-MCNC: NEGATIVE MG/DL
HCT VFR BLD AUTO: 36.3 % (ref 35–47)
HGB BLD-MCNC: 12.4 G/DL (ref 11.7–15.7)
HGB UR QL STRIP: NEGATIVE
HYALINE CASTS: 1 /LPF
IMM GRANULOCYTES # BLD: 0 10E3/UL
IMM GRANULOCYTES NFR BLD: 0 %
KETONES UR STRIP-MCNC: NEGATIVE MG/DL
LEUKOCYTE ESTERASE UR QL STRIP: ABNORMAL
LIPASE SERPL-CCNC: 77 U/L (ref 13–60)
LYMPHOCYTES # BLD AUTO: 1.6 10E3/UL (ref 0.8–5.3)
LYMPHOCYTES NFR BLD AUTO: 15 %
MCH RBC QN AUTO: 29.7 PG (ref 26.5–33)
MCHC RBC AUTO-ENTMCNC: 34.2 G/DL (ref 31.5–36.5)
MCV RBC AUTO: 87 FL (ref 78–100)
MONOCYTES # BLD AUTO: 0.8 10E3/UL (ref 0–1.3)
MONOCYTES NFR BLD AUTO: 7 %
MUCOUS THREADS #/AREA URNS LPF: PRESENT /LPF
NEUTROPHILS # BLD AUTO: 8 10E3/UL (ref 1.6–8.3)
NEUTROPHILS NFR BLD AUTO: 76 %
NITRATE UR QL: NEGATIVE
NRBC # BLD AUTO: 0 10E3/UL
NRBC BLD AUTO-RTO: 0 /100
PH UR STRIP: 5.5 [PH] (ref 5–7)
PLATELET # BLD AUTO: 299 10E3/UL (ref 150–450)
POTASSIUM SERPL-SCNC: 3.9 MMOL/L (ref 3.4–5.3)
PROT SERPL-MCNC: 6.6 G/DL (ref 6.4–8.3)
RBC # BLD AUTO: 4.17 10E6/UL (ref 3.8–5.2)
RBC URINE: 1 /HPF
SODIUM SERPL-SCNC: 140 MMOL/L (ref 135–145)
SP GR UR STRIP: 1.03 (ref 1–1.03)
SQUAMOUS EPITHELIAL: 1 /HPF
UROBILINOGEN UR STRIP-MCNC: <2 MG/DL
WBC # BLD AUTO: 10.5 10E3/UL (ref 4–11)
WBC URINE: 19 /HPF

## 2024-06-11 PROCEDURE — 80053 COMPREHEN METABOLIC PANEL: CPT | Performed by: EMERGENCY MEDICINE

## 2024-06-11 PROCEDURE — 74018 RADEX ABDOMEN 1 VIEW: CPT

## 2024-06-11 PROCEDURE — 81001 URINALYSIS AUTO W/SCOPE: CPT | Performed by: EMERGENCY MEDICINE

## 2024-06-11 PROCEDURE — 99284 EMERGENCY DEPT VISIT MOD MDM: CPT | Mod: 25

## 2024-06-11 PROCEDURE — 85025 COMPLETE CBC W/AUTO DIFF WBC: CPT | Performed by: EMERGENCY MEDICINE

## 2024-06-11 PROCEDURE — 36415 COLL VENOUS BLD VENIPUNCTURE: CPT | Performed by: EMERGENCY MEDICINE

## 2024-06-11 PROCEDURE — 250N000013 HC RX MED GY IP 250 OP 250 PS 637: Performed by: EMERGENCY MEDICINE

## 2024-06-11 PROCEDURE — 250N000011 HC RX IP 250 OP 636: Mod: JZ | Performed by: EMERGENCY MEDICINE

## 2024-06-11 PROCEDURE — 87086 URINE CULTURE/COLONY COUNT: CPT | Performed by: EMERGENCY MEDICINE

## 2024-06-11 PROCEDURE — 96375 TX/PRO/DX INJ NEW DRUG ADDON: CPT

## 2024-06-11 PROCEDURE — 83690 ASSAY OF LIPASE: CPT | Performed by: EMERGENCY MEDICINE

## 2024-06-11 PROCEDURE — 96374 THER/PROPH/DIAG INJ IV PUSH: CPT

## 2024-06-11 RX ORDER — HYDROMORPHONE HYDROCHLORIDE 1 MG/ML
0.5 INJECTION, SOLUTION INTRAMUSCULAR; INTRAVENOUS; SUBCUTANEOUS ONCE
Status: COMPLETED | OUTPATIENT
Start: 2024-06-11 | End: 2024-06-11

## 2024-06-11 RX ORDER — CEPHALEXIN 500 MG/1
500 CAPSULE ORAL ONCE
Status: COMPLETED | OUTPATIENT
Start: 2024-06-11 | End: 2024-06-11

## 2024-06-11 RX ORDER — ONDANSETRON 2 MG/ML
4 INJECTION INTRAMUSCULAR; INTRAVENOUS ONCE
Status: COMPLETED | OUTPATIENT
Start: 2024-06-11 | End: 2024-06-11

## 2024-06-11 RX ORDER — CEPHALEXIN 500 MG/1
500 CAPSULE ORAL 2 TIMES DAILY
Qty: 14 CAPSULE | Refills: 0 | Status: SHIPPED | OUTPATIENT
Start: 2024-06-11 | End: 2024-06-18

## 2024-06-11 RX ADMIN — CEPHALEXIN 500 MG: 500 CAPSULE ORAL at 02:33

## 2024-06-11 RX ADMIN — ONDANSETRON 4 MG: 2 INJECTION INTRAMUSCULAR; INTRAVENOUS at 01:32

## 2024-06-11 RX ADMIN — HYDROMORPHONE HYDROCHLORIDE 0.5 MG: 1 INJECTION, SOLUTION INTRAMUSCULAR; INTRAVENOUS; SUBCUTANEOUS at 01:32

## 2024-06-11 ASSESSMENT — COLUMBIA-SUICIDE SEVERITY RATING SCALE - C-SSRS
6. HAVE YOU EVER DONE ANYTHING, STARTED TO DO ANYTHING, OR PREPARED TO DO ANYTHING TO END YOUR LIFE?: NO
1. IN THE PAST MONTH, HAVE YOU WISHED YOU WERE DEAD OR WISHED YOU COULD GO TO SLEEP AND NOT WAKE UP?: NO
2. HAVE YOU ACTUALLY HAD ANY THOUGHTS OF KILLING YOURSELF IN THE PAST MONTH?: NO

## 2024-06-11 ASSESSMENT — ACTIVITIES OF DAILY LIVING (ADL): ADLS_ACUITY_SCORE: 35

## 2024-06-11 NOTE — ED PROVIDER NOTES
EMERGENCY DEPARTMENT ENCOUnter      NAME: Maddy Torres  AGE: 64 year old female  YOB: 1960  MRN: [id number]  EVALUATION DATE & TIME: No admission date for patient encounter.    PCP: Lara Valentin    ED PROVIDER: Jane Campbell MD      Chief Complaint   Patient presents with    Abdominal Pain    Nausea & Vomiting         FINAL IMPRESSION:  1. Biliary colic    2. Symptomatic cholelithiasis    3. Acute cystitis without hematuria    4. Constipation, unspecified constipation type          ED COURSE & MEDICAL DECISION MAKING:      In summary, the patient is a 64-year-old female who presents to the emergency department for evaluation of upper abdominal pain from recurrent biliary colic.  The patient was supposed to have her gallbladder removed yesterday morning, but she canceled the appointment due to questions about her insurance.  She did not take her oxycodone at home for pain.  I think she can take pain medication and antiemetics to control her symptoms at home.  She has no evidence of obstruction at this time.  She is incidentally noted to have a urinary tract infection which we will treat with oral antibiotics.      0105-evaluated patient.  Dilaudid 0.5 mg V was administered for pain.  Zofran 4 mg IV was administered for nausea.  0224 - updated the patient.  Keflex 500 mg p.o. was administered for initiation of antibiotic therapy before treatment of urinary tract infection.    Medical Decision Making  Obtained supplemental history:Supplemental history obtained?: No  Reviewed external records: External records reviewed?: No  Care impacted by chronic illness:N/A  Care significantly affected by social determinants of health:N/A  Did you consider but not order tests?: Work up considered but not performed and documented in chart, if applicable  Did you interpret images independently?: Independent interpretation of ECG and images noted in documentation, when applicable.  Consultation  discussion with other provider:Did you involve another provider (consultant, , pharmacy, etc.)?: No  Discharge. I prescribed additional prescription strength medication(s) as charted. See documentation for any additional details.      At the conclusion of the encounter I discussed the results of all of the tests and the disposition. The questions were answered. The patient or family acknowledged understanding and was agreeable with the care plan.         MEDICATIONS GIVEN IN THE EMERGENCY:  Medications   HYDROmorphone (PF) (DILAUDID) injection 0.5 mg (0.5 mg Intravenous $Given 6/11/24 0132)   ondansetron (ZOFRAN) injection 4 mg (4 mg Intravenous $Given 6/11/24 0132)   cephALEXin (KEFLEX) capsule 500 mg (500 mg Oral $Given 6/11/24 0233)       NEW PRESCRIPTIONS STARTED AT TODAY'S ER VISIT  New Prescriptions    CEPHALEXIN (KEFLEX) 500 MG CAPSULE    Take 1 capsule (500 mg) by mouth 2 times daily for 7 days          =================================================================    HPI        Maddy Torres is a 64 year old female with a pertinent history of cholelithiasis presents to the emergency department for evaluation of upper abdominal pain.  She describes her pain as sharp, constant, 9 out of 10 in intensity, no radiation, and not relieved or exacerbated by any particular activity.  She has nausea and vomiting.  She states that she has had low-grade fevers.  She took Tylenol and ibuprofen for pain at home, which was not effective.      REVIEW OF SYSTEMS     Constitutional:  Denies fever or chills  HENT:  Denies sore throat   Respiratory:  Denies cough or shortness of breath   Cardiovascular:  Denies chest pain or palpitations  GI:  Denies abdominal pain, nausea, or vomiting  Musculoskeletal:  Denies any new extremity pain   Skin:  Denies rash   Neurologic:  Denies headache, focal weakness or sensory changes    All other systems reviewed and are negative      PAST MEDICAL HISTORY:  Past Medical History:    Diagnosis Date    Endometriosis     Hyperlipaemia     Hypothyroid     Insomnia     Malaise and fatigue     Obesity     PONV (postoperative nausea and vomiting)     Vitamin D deficiency        PAST SURGICAL HISTORY:  Past Surgical History:   Procedure Laterality Date    COLONOSCOPY      LAPAROSCOPY,PELVIC,BIOPSY      X 2    OPERATIVE HYSTEROSCOPY WITH MORCELLATOR  12/17/2013    Procedure: OPERATIVE HYSTEROSCOPY WITH MORCELLATOR;  OPERATIVE HYSTEROSCOPY, POLYPECTOMY WITH MONTILLA AND NEPHEW 5.0 MM MORCELLATOR;  Surgeon: Jessica Johnston MD;  Location:  [facility]    WISDOM TEETH[             CURRENT MEDICATIONS:    cephALEXin (KEFLEX) 500 MG capsule  ALPRAZolam (XANAX PO)  Atorvastatin Calcium (LIPITOR PO)  calcium-vitamin D (CALTRATE) 600-400 MG-UNIT per tablet  Cetirizine HCl (ZYRTEC PO)  Cholecalciferol (VITAMIN D3 PO)  fluticasone (FLONASE) 50 MCG/ACT nasal spray  ibuprofen (ADVIL,MOTRIN) 400 MG tablet  MELATONIN PO  Multiple Vitamin (MULTI-VITAMIN DAILY PO)  ondansetron (ZOFRAN ODT) 4 MG ODT tab  ondansetron (ZOFRAN ODT) 4 MG ODT tab  oxyCODONE (ROXICODONE) 5 MG tablet  oxyCODONE-acetaminophen (PERCOCET) 5-325 MG per tablet  SENNA-docusate sodium (SENNA S) 8.6-50 MG tablet  Zolpidem Tartrate (AMBIEN PO)        ALLERGIES:  Allergies   Allergen Reactions    Cortisone Anxiety, Headache, Other (See Comments), Rash and Shortness Of Breath     Agitation after fasciitis injection.    Other Drug Allergy (See Comments) Headache, Other (See Comments) and Shortness Of Breath       FAMILY HISTORY:  History reviewed. No pertinent family history.    SOCIAL HISTORY:   Social History     Socioeconomic History    Marital status:      Spouse name: None    Number of children: None    Years of education: None    Highest education level: None   Tobacco Use    Smoking status: Never   Substance and Sexual Activity    Alcohol use: Yes     Comment: SOCIALLY     Social Determinants of Health     Financial Resource Strain: Low Risk   "(7/4/2023)    Received from [facility]    Financial Resource Strain     Difficulty of Paying Living Expenses: 3   Food Insecurity: No Food Insecurity (7/4/2023)    Received from [facility]    Food Insecurity     Worried About Running Out of Food in the Last Year: 1   Transportation Needs: No Transportation Needs (7/4/2023)    [facility]    Transportation Needs     Lack of Transportation (Medical): 1   Physical Activity: Insufficiently Active (6/9/2022)    Received from [facility]    Exercise Vital Sign     Days of Exercise per Week: 4 days     Minutes of Exercise per Session: 30 min   Stress: No Stress Concern Present (6/9/2022)    Received from [facility]    [employer] [place] [employer] - Occupational Stress Questionnaire     Feeling of Stress : Only a little   Social Connections: Socially Integrated (7/4/2023)    Received from [facility]    Social Connections     Frequency of Communication with Friends and Family: 0   Interpersonal Safety: Not At Risk (6/9/2022)    Received from [facility]    Humiliation, Afraid, Rape, and Kick questionnaire     Fear of Current or Ex-Partner: No     Emotionally Abused: No     Physically Abused: No     Sexually Abused: No   Housing Stability: Low Risk  (7/4/2023)    Received from [facility]    Housing Stability     Unable to Pay for Housing in the Last Year: 1       VITALS:  Patient Vitals for the past 24 hrs:   BP Temp Temp src Pulse Resp SpO2 Height Weight   06/11/24 0230 126/73 -- -- 58 -- 94 % -- --   06/11/24 0104 (!) 158/73 98.6  F (37  C) Temporal 74 20 95 % 1.626 m (5' 4\") 79.8 kg (176 lb)       PHYSICAL EXAM    Constitutional:  Well developed, Well nourished,  HENT:  Normocephalic, Atraumatic, Bilateral external ears normal, Oropharynx moist, Nose normal.   Neck:  Normal range " of motion, No meningismus, No stridor.   Eyes:  EOMI, Conjunctiva normal, No discharge.   Respiratory:  Normal breath sounds, No respiratory distress, No wheezing, No chest tenderness.   Cardiovascular:  Normal heart rate, Normal rhythm, No murmurs  GI:  Soft, epigastric tenderness, No guarding, No CVA tenderness.   Musculoskeletal:  Neurovascularly intact distally, No edema, No tenderness, No cyanosis, Good range of motion in all major joints.  Integument:  Warm, Dry, No erythema, No rash.   Lymphatic:  No lymphadenopathy noted.   Neurologic:  Alert & oriented , Normal motor function,  No focal deficits noted.   Psychiatric:  Affect normal, Judgment normal, Mood normal.      LAB:  All pertinent labs reviewed and interpreted.  Results for orders placed or performed during the hospital encounter of 06/11/24   XR Abdomen Upright Only    Impression    IMPRESSION: Upright KUB. Nonspecific bowel gas pattern. Mild to moderate residual stool in the ascending and transverse colon. The distal colon and rectum are nondistended and contains scattered stool. Nothing for obstruction or free air. No evidence for   renal stones.   Comprehensive metabolic panel   Result Value Ref Range    Sodium 140 135 - 145 mmol/L    Potassium 3.9 3.4 - 5.3 mmol/L    Carbon Dioxide (CO2) 24 22 - 29 mmol/L    Anion Gap 9 7 - 15 mmol/L    Urea Nitrogen 8.6 8.0 - 23.0 mg/dL    Creatinine 0.86 0.51 - 0.95 mg/dL    GFR Estimate 75 >60 mL/min/1.73m2    Calcium 9.3 8.8 - 10.2 mg/dL    Chloride 107 98 - 107 mmol/L    Glucose 113 (H) 70 - 99 mg/dL    Alkaline Phosphatase 96 40 - 150 U/L    AST 21 0 - 45 U/L    ALT 23 0 - 50 U/L    Protein Total 6.6 6.4 - 8.3 g/dL    Albumin 4.0 3.5 - 5.2 g/dL    Bilirubin Total 0.3 <=1.2 mg/dL   Result Value Ref Range    Lipase 77 (H) 13 - 60 U/L   UA with Microscopic reflex to Culture    Specimen: Urine, Clean Catch   Result Value Ref Range    Color Urine Yellow Colorless, Straw, Light Yellow, Yellow    Appearance Urine  Clear Clear    Glucose Urine Negative Negative mg/dL    Bilirubin Urine Negative Negative    Ketones Urine Negative Negative mg/dL    Specific Gravity Urine 1.030 1.001 - 1.030    Blood Urine Negative Negative    pH Urine 5.5 5.0 - 7.0    Protein Albumin Urine 30 (A) Negative mg/dL    Urobilinogen Urine <2.0 <2.0 mg/dL    Nitrite Urine Negative Negative    Leukocyte Esterase Urine 75 Aixa/uL (A) Negative    Mucus Urine Present (A) None Seen /LPF    RBC Urine 1 <=2 /HPF    WBC Urine 19 (H) <=5 /HPF    Squamous Epithelials Urine 1 <=1 /HPF    Hyaline Casts Urine 1 <=2 /LPF   CBC with platelets and differential   Result Value Ref Range    WBC Count 10.5 4.0 - 11.0 10e3/uL    RBC Count 4.17 3.80 - 5.20 10e6/uL    Hemoglobin 12.4 11.7 - 15.7 g/dL    Hematocrit 36.3 35.0 - 47.0 %    MCV 87 78 - 100 fL    MCH 29.7 26.5 - 33.0 pg    MCHC 34.2 31.5 - 36.5 g/dL    RDW 11.9 10.0 - 15.0 %    Platelet Count 299 150 - 450 10e3/uL    % Neutrophils 76 %    % Lymphocytes 15 %    % Monocytes 7 %    % Eosinophils 1 %    % Basophils 0 %    % Immature Granulocytes 0 %    NRBCs per 100 WBC 0 <1 /100    Absolute Neutrophils 8.0 1.6 - 8.3 10e3/uL    Absolute Lymphocytes 1.6 0.8 - 5.3 10e3/uL    Absolute Monocytes 0.8 0.0 - 1.3 10e3/uL    Absolute Eosinophils 0.1 0.0 - 0.7 10e3/uL    Absolute Basophils 0.0 0.0 - 0.2 10e3/uL    Absolute Immature Granulocytes 0.0 <=0.4 10e3/uL    Absolute NRBCs 0.0 10e3/uL       RADIOLOGY:  I have independently reviewed and interpreted the above imaging, pending the final radiology read.  XR Abdomen Upright Only   Final Result   IMPRESSION: Upright KUB. Nonspecific bowel gas pattern. Mild to moderate residual stool in the ascending and transverse colon. The distal colon and rectum are nondistended and contains scattered stool. Nothing for obstruction or free air. No evidence for    renal stones.              Jane Campbell MD  Emergency Medicine  [facility] EMERGENCY ROOM  [address] [place] [id number]  [phone]  Dept: [phone]     Jane Campbell MD  06/11/24 0237

## 2024-06-11 NOTE — DISCHARGE INSTRUCTIONS
Clear liquid diet if having abdominal pain  Avoid fatty foods and beverages  Ibuprofen 600 mg every 6 hours as needed for pain  Dramamine as needed for nausea or vomiting  Increase your MiraLAX to twice daily until your bowel movements are regular  Continue your oxycodone as previously prescribed and recommended  Call your surgeon this morning to reschedule your cholecystectomy

## 2024-06-11 NOTE — ED TRIAGE NOTES
Patient arrives to the ER with c/o abdominal pain, fever, N/V. Patient states that she was seen here Saturday and was told she needed her gallbladder removed. She was supposed to have it removed today, but hadn't checked with her insurance yet. Pain. Nausea and fevers worsened, prompting her to come to the ER.     Triage Assessment (Adult)       Row Name 06/11/24 0106          Triage Assessment    Airway WDL WDL        Respiratory WDL    Respiratory WDL WDL        Skin Circulation/Temperature WDL    Skin Circulation/Temperature WDL WDL        Cardiac WDL    Cardiac WDL WDL        Peripheral/Neurovascular WDL    Peripheral Neurovascular WDL WDL        Cognitive/Neuro/Behavioral WDL    Cognitive/Neuro/Behavioral WDL WDL

## 2024-06-12 DIAGNOSIS — K81.0 ACUTE CHOLECYSTITIS: Primary | ICD-10-CM

## 2024-06-12 LAB — BACTERIA UR CULT: NORMAL

## 2024-06-12 RX ORDER — CEFAZOLIN SODIUM/WATER 2 G/20 ML
2 SYRINGE (ML) INTRAVENOUS SEE ADMIN INSTRUCTIONS
Status: CANCELLED | OUTPATIENT
Start: 2024-06-14

## 2024-06-12 RX ORDER — CEFAZOLIN SODIUM/WATER 2 G/20 ML
2 SYRINGE (ML) INTRAVENOUS
Status: CANCELLED | OUTPATIENT
Start: 2024-06-14

## 2024-06-12 RX ORDER — INDOCYANINE GREEN AND WATER 25 MG
2.5 KIT INJECTION ONCE
Status: CANCELLED | OUTPATIENT
Start: 2024-06-12 | End: 2024-06-12

## 2024-06-12 NOTE — TELEPHONE ENCOUNTER
Spoke with patient today regarding surgery scheduling      Went over details/instructions.    Surgery Letter sent via [employer]  (Please see LETTERS TAB in chart to retrieve a copy of this letter)

## 2024-06-13 ENCOUNTER — ANESTHESIA EVENT (OUTPATIENT)
Dept: SURGERY | Facility: HOSPITAL | Age: 64
End: 2024-06-13
Payer: COMMERCIAL

## 2024-06-14 ENCOUNTER — HOSPITAL ENCOUNTER (OUTPATIENT)
Facility: HOSPITAL | Age: 64
Discharge: HOME | End: 2024-06-14
Attending: SURGERY | Admitting: SURGERY
Payer: COMMERCIAL

## 2024-06-14 ENCOUNTER — ANESTHESIA (OUTPATIENT)
Dept: SURGERY | Facility: HOSPITAL | Age: 64
End: 2024-06-14
Payer: COMMERCIAL

## 2024-06-14 VITALS
BODY MASS INDEX: 30.19 KG/M2 | HEART RATE: 70 BPM | TEMPERATURE: 98.4 F | SYSTOLIC BLOOD PRESSURE: 117 MMHG | DIASTOLIC BLOOD PRESSURE: 66 MMHG | OXYGEN SATURATION: 94 % | RESPIRATION RATE: 18 BRPM | WEIGHT: 175.9 LBS

## 2024-06-14 DIAGNOSIS — K81.0 ACUTE CHOLECYSTITIS: Primary | ICD-10-CM

## 2024-06-14 PROCEDURE — 999N000141 HC STATISTIC PRE-PROCEDURE NURSING ASSESSMENT: Performed by: SURGERY

## 2024-06-14 PROCEDURE — 258N000003 HC RX IP 258 OP 636: Mod: JZ | Performed by: ANESTHESIOLOGY

## 2024-06-14 PROCEDURE — 250N000011 HC RX IP 250 OP 636: Performed by: SURGERY

## 2024-06-14 PROCEDURE — 272N000001 HC OR GENERAL SUPPLY STERILE: Performed by: SURGERY

## 2024-06-14 PROCEDURE — 250N000013 HC RX MED GY IP 250 OP 250 PS 637: Performed by: ANESTHESIOLOGY

## 2024-06-14 PROCEDURE — 258N000001 HC RX 258: Performed by: SURGERY

## 2024-06-14 PROCEDURE — 370N000017 HC ANESTHESIA TECHNICAL FEE, PER MIN: Performed by: SURGERY

## 2024-06-14 PROCEDURE — 250N000009 HC RX 250: Performed by: SURGERY

## 2024-06-14 PROCEDURE — 250N000009 HC RX 250

## 2024-06-14 PROCEDURE — 250N000009 HC RX 250: Performed by: ANESTHESIOLOGY

## 2024-06-14 PROCEDURE — 710N000009 HC RECOVERY PHASE 1, LEVEL 1, PER MIN: Performed by: SURGERY

## 2024-06-14 PROCEDURE — 250N000025 HC SEVOFLURANE, PER MIN: Performed by: SURGERY

## 2024-06-14 PROCEDURE — S2900 ROBOTIC SURGICAL SYSTEM: HCPCS | Performed by: SURGERY

## 2024-06-14 PROCEDURE — 250N000011 HC RX IP 250 OP 636: Mod: JZ

## 2024-06-14 PROCEDURE — 710N000012 HC RECOVERY PHASE 2, PER MINUTE: Performed by: SURGERY

## 2024-06-14 PROCEDURE — 88304 TISSUE EXAM BY PATHOLOGIST: CPT | Mod: TC | Performed by: SURGERY

## 2024-06-14 PROCEDURE — 88304 TISSUE EXAM BY PATHOLOGIST: CPT | Mod: 26 | Performed by: PATHOLOGY

## 2024-06-14 PROCEDURE — 360N000080 HC SURGERY LEVEL 7, PER MIN: Performed by: SURGERY

## 2024-06-14 PROCEDURE — 250N000011 HC RX IP 250 OP 636: Mod: JZ | Performed by: ANESTHESIOLOGY

## 2024-06-14 PROCEDURE — 47562 LAPAROSCOPIC CHOLECYSTECTOMY: CPT | Performed by: SURGERY

## 2024-06-14 RX ORDER — NALOXONE HYDROCHLORIDE 0.4 MG/ML
0.1 INJECTION, SOLUTION INTRAMUSCULAR; INTRAVENOUS; SUBCUTANEOUS
Status: DISCONTINUED | OUTPATIENT
Start: 2024-06-14 | End: 2024-06-14 | Stop reason: HOSPADM

## 2024-06-14 RX ORDER — OXYCODONE HYDROCHLORIDE 5 MG/1
5 TABLET ORAL
Status: COMPLETED | OUTPATIENT
Start: 2024-06-14 | End: 2024-06-14

## 2024-06-14 RX ORDER — ONDANSETRON 2 MG/ML
4 INJECTION INTRAMUSCULAR; INTRAVENOUS EVERY 30 MIN PRN
Status: DISCONTINUED | OUTPATIENT
Start: 2024-06-14 | End: 2024-06-14 | Stop reason: HOSPADM

## 2024-06-14 RX ORDER — OXYCODONE HYDROCHLORIDE 5 MG/1
10 TABLET ORAL
Status: DISCONTINUED | OUTPATIENT
Start: 2024-06-14 | End: 2024-06-14 | Stop reason: HOSPADM

## 2024-06-14 RX ORDER — ONDANSETRON 2 MG/ML
INJECTION INTRAMUSCULAR; INTRAVENOUS PRN
Status: DISCONTINUED | OUTPATIENT
Start: 2024-06-14 | End: 2024-06-14

## 2024-06-14 RX ORDER — HYDROMORPHONE HCL IN WATER/PF 6 MG/30 ML
0.4 PATIENT CONTROLLED ANALGESIA SYRINGE INTRAVENOUS EVERY 5 MIN PRN
Status: DISCONTINUED | OUTPATIENT
Start: 2024-06-14 | End: 2024-06-14 | Stop reason: HOSPADM

## 2024-06-14 RX ORDER — LABETALOL HYDROCHLORIDE 5 MG/ML
10 INJECTION, SOLUTION INTRAVENOUS
Status: DISCONTINUED | OUTPATIENT
Start: 2024-06-14 | End: 2024-06-14 | Stop reason: HOSPADM

## 2024-06-14 RX ORDER — DEXAMETHASONE SODIUM PHOSPHATE 4 MG/ML
4 INJECTION, SOLUTION INTRA-ARTICULAR; INTRALESIONAL; INTRAMUSCULAR; INTRAVENOUS; SOFT TISSUE
Status: DISCONTINUED | OUTPATIENT
Start: 2024-06-14 | End: 2024-06-14 | Stop reason: HOSPADM

## 2024-06-14 RX ORDER — ONDANSETRON 4 MG/1
4 TABLET, ORALLY DISINTEGRATING ORAL EVERY 30 MIN PRN
Status: DISCONTINUED | OUTPATIENT
Start: 2024-06-14 | End: 2024-06-14 | Stop reason: HOSPADM

## 2024-06-14 RX ORDER — FENTANYL CITRATE 50 UG/ML
INJECTION, SOLUTION INTRAMUSCULAR; INTRAVENOUS PRN
Status: DISCONTINUED | OUTPATIENT
Start: 2024-06-14 | End: 2024-06-14

## 2024-06-14 RX ORDER — PROPOFOL 10 MG/ML
INJECTION, EMULSION INTRAVENOUS CONTINUOUS PRN
Status: DISCONTINUED | OUTPATIENT
Start: 2024-06-14 | End: 2024-06-14

## 2024-06-14 RX ORDER — CEFAZOLIN SODIUM/WATER 2 G/20 ML
2 SYRINGE (ML) INTRAVENOUS
Status: COMPLETED | OUTPATIENT
Start: 2024-06-14 | End: 2024-06-14

## 2024-06-14 RX ORDER — OXYCODONE HYDROCHLORIDE 5 MG/1
5 TABLET ORAL EVERY 6 HOURS PRN
Qty: 12 TABLET | Refills: 0 | Status: SHIPPED | OUTPATIENT
Start: 2024-06-14 | End: 2024-06-17

## 2024-06-14 RX ORDER — PROPOFOL 10 MG/ML
INJECTION, EMULSION INTRAVENOUS PRN
Status: DISCONTINUED | OUTPATIENT
Start: 2024-06-14 | End: 2024-06-14

## 2024-06-14 RX ORDER — LIDOCAINE HYDROCHLORIDE 10 MG/ML
INJECTION, SOLUTION INFILTRATION; PERINEURAL PRN
Status: DISCONTINUED | OUTPATIENT
Start: 2024-06-14 | End: 2024-06-14

## 2024-06-14 RX ORDER — SCOLOPAMINE TRANSDERMAL SYSTEM 1 MG/1
1 PATCH, EXTENDED RELEASE TRANSDERMAL
Status: DISCONTINUED | OUTPATIENT
Start: 2024-06-14 | End: 2024-06-14 | Stop reason: HOSPADM

## 2024-06-14 RX ORDER — LIDOCAINE HYDROCHLORIDE AND EPINEPHRINE 10; 10 MG/ML; UG/ML
INJECTION, SOLUTION INFILTRATION; PERINEURAL PRN
Status: DISCONTINUED | OUTPATIENT
Start: 2024-06-14 | End: 2024-06-14 | Stop reason: HOSPADM

## 2024-06-14 RX ORDER — INDOCYANINE GREEN AND WATER 25 MG
2.5 KIT INJECTION ONCE
Status: COMPLETED | OUTPATIENT
Start: 2024-06-14 | End: 2024-06-14

## 2024-06-14 RX ORDER — FENTANYL CITRATE 50 UG/ML
25 INJECTION, SOLUTION INTRAMUSCULAR; INTRAVENOUS EVERY 5 MIN PRN
Status: DISCONTINUED | OUTPATIENT
Start: 2024-06-14 | End: 2024-06-14 | Stop reason: HOSPADM

## 2024-06-14 RX ORDER — DEXAMETHASONE SODIUM PHOSPHATE 10 MG/ML
INJECTION, SOLUTION INTRAMUSCULAR; INTRAVENOUS PRN
Status: DISCONTINUED | OUTPATIENT
Start: 2024-06-14 | End: 2024-06-14

## 2024-06-14 RX ORDER — LIDOCAINE 40 MG/G
CREAM TOPICAL
Status: DISCONTINUED | OUTPATIENT
Start: 2024-06-14 | End: 2024-06-14 | Stop reason: HOSPADM

## 2024-06-14 RX ORDER — KETOROLAC TROMETHAMINE 30 MG/ML
INJECTION, SOLUTION INTRAMUSCULAR; INTRAVENOUS PRN
Status: DISCONTINUED | OUTPATIENT
Start: 2024-06-14 | End: 2024-06-14

## 2024-06-14 RX ORDER — SODIUM CHLORIDE, SODIUM LACTATE, POTASSIUM CHLORIDE, CALCIUM CHLORIDE 600; 310; 30; 20 MG/100ML; MG/100ML; MG/100ML; MG/100ML
INJECTION, SOLUTION INTRAVENOUS CONTINUOUS
Status: DISCONTINUED | OUTPATIENT
Start: 2024-06-14 | End: 2024-06-14 | Stop reason: HOSPADM

## 2024-06-14 RX ORDER — DOCUSATE SODIUM 100 MG/1
100 CAPSULE, LIQUID FILLED ORAL 2 TIMES DAILY
Qty: 30 CAPSULE | Refills: 0 | Status: SHIPPED | OUTPATIENT
Start: 2024-06-14

## 2024-06-14 RX ORDER — TERIPARATIDE 250 UG/ML
20 INJECTION, SOLUTION SUBCUTANEOUS DAILY
COMMUNITY

## 2024-06-14 RX ORDER — OXYCODONE HYDROCHLORIDE 5 MG/1
5 TABLET ORAL EVERY 6 HOURS PRN
COMMUNITY

## 2024-06-14 RX ORDER — FENTANYL CITRATE 50 UG/ML
50 INJECTION, SOLUTION INTRAMUSCULAR; INTRAVENOUS EVERY 5 MIN PRN
Status: DISCONTINUED | OUTPATIENT
Start: 2024-06-14 | End: 2024-06-14 | Stop reason: HOSPADM

## 2024-06-14 RX ORDER — HYDROMORPHONE HCL IN WATER/PF 6 MG/30 ML
0.2 PATIENT CONTROLLED ANALGESIA SYRINGE INTRAVENOUS EVERY 5 MIN PRN
Status: DISCONTINUED | OUTPATIENT
Start: 2024-06-14 | End: 2024-06-14 | Stop reason: HOSPADM

## 2024-06-14 RX ORDER — ACETAMINOPHEN 325 MG/1
975 TABLET ORAL ONCE
Status: COMPLETED | OUTPATIENT
Start: 2024-06-14 | End: 2024-06-14

## 2024-06-14 RX ORDER — CEFAZOLIN SODIUM/WATER 2 G/20 ML
2 SYRINGE (ML) INTRAVENOUS SEE ADMIN INSTRUCTIONS
Status: DISCONTINUED | OUTPATIENT
Start: 2024-06-14 | End: 2024-06-14 | Stop reason: HOSPADM

## 2024-06-14 RX ADMIN — SCOPALAMINE 1 PATCH: 1 PATCH, EXTENDED RELEASE TRANSDERMAL at 06:54

## 2024-06-14 RX ADMIN — PROPOFOL 50 MCG/KG/MIN: 10 INJECTION, EMULSION INTRAVENOUS at 07:30

## 2024-06-14 RX ADMIN — SODIUM CHLORIDE, POTASSIUM CHLORIDE, SODIUM LACTATE AND CALCIUM CHLORIDE: 600; 310; 30; 20 INJECTION, SOLUTION INTRAVENOUS at 06:31

## 2024-06-14 RX ADMIN — ONDANSETRON 4 MG: 2 INJECTION INTRAMUSCULAR; INTRAVENOUS at 08:25

## 2024-06-14 RX ADMIN — SODIUM CHLORIDE, POTASSIUM CHLORIDE, SODIUM LACTATE AND CALCIUM CHLORIDE: 600; 310; 30; 20 INJECTION, SOLUTION INTRAVENOUS at 08:40

## 2024-06-14 RX ADMIN — HYDROMORPHONE HYDROCHLORIDE 0.5 MG: 1 INJECTION, SOLUTION INTRAMUSCULAR; INTRAVENOUS; SUBCUTANEOUS at 08:16

## 2024-06-14 RX ADMIN — ROCURONIUM BROMIDE 25 MG: 50 INJECTION, SOLUTION INTRAVENOUS at 07:43

## 2024-06-14 RX ADMIN — FENTANYL CITRATE 25 MCG: 50 INJECTION, SOLUTION INTRAMUSCULAR; INTRAVENOUS at 09:21

## 2024-06-14 RX ADMIN — DEXAMETHASONE SODIUM PHOSPHATE 10 MG: 10 INJECTION, SOLUTION INTRAMUSCULAR; INTRAVENOUS at 07:26

## 2024-06-14 RX ADMIN — FENTANYL CITRATE 25 MCG: 50 INJECTION, SOLUTION INTRAMUSCULAR; INTRAVENOUS at 09:03

## 2024-06-14 RX ADMIN — SUGAMMADEX 200 MG: 100 INJECTION, SOLUTION INTRAVENOUS at 08:33

## 2024-06-14 RX ADMIN — FENTANYL CITRATE 100 MCG: 50 INJECTION INTRAMUSCULAR; INTRAVENOUS at 07:25

## 2024-06-14 RX ADMIN — HYDROMORPHONE HYDROCHLORIDE 0.5 MG: 1 INJECTION, SOLUTION INTRAMUSCULAR; INTRAVENOUS; SUBCUTANEOUS at 07:52

## 2024-06-14 RX ADMIN — FENTANYL CITRATE 25 MCG: 50 INJECTION, SOLUTION INTRAMUSCULAR; INTRAVENOUS at 09:09

## 2024-06-14 RX ADMIN — ACETAMINOPHEN 975 MG: 325 TABLET ORAL at 06:37

## 2024-06-14 RX ADMIN — MIDAZOLAM 2 MG: 1 INJECTION INTRAMUSCULAR; INTRAVENOUS at 07:18

## 2024-06-14 RX ADMIN — LIDOCAINE HYDROCHLORIDE 4 ML: 10 INJECTION, SOLUTION INFILTRATION; PERINEURAL at 07:25

## 2024-06-14 RX ADMIN — ROCURONIUM BROMIDE 50 MG: 50 INJECTION, SOLUTION INTRAVENOUS at 07:26

## 2024-06-14 RX ADMIN — HYDROMORPHONE HYDROCHLORIDE 0.2 MG: 0.2 INJECTION, SOLUTION INTRAMUSCULAR; INTRAVENOUS; SUBCUTANEOUS at 09:27

## 2024-06-14 RX ADMIN — INDOCYANINE GREEN AND WATER 2.5 MG: KIT at 06:46

## 2024-06-14 RX ADMIN — KETOROLAC TROMETHAMINE 15 MG: 30 INJECTION, SOLUTION INTRAMUSCULAR at 08:30

## 2024-06-14 RX ADMIN — FENTANYL CITRATE 25 MCG: 50 INJECTION, SOLUTION INTRAMUSCULAR; INTRAVENOUS at 09:15

## 2024-06-14 RX ADMIN — PROPOFOL 170 MG: 10 INJECTION, EMULSION INTRAVENOUS at 07:26

## 2024-06-14 RX ADMIN — OXYCODONE HYDROCHLORIDE 5 MG: 5 TABLET ORAL at 10:06

## 2024-06-14 RX ADMIN — Medication 2 G: at 07:18

## 2024-06-14 ASSESSMENT — ACTIVITIES OF DAILY LIVING (ADL)
ADLS_ACUITY_SCORE: 20
ADLS_ACUITY_SCORE: 20
ADLS_ACUITY_SCORE: 29
ADLS_ACUITY_SCORE: 20
ADLS_ACUITY_SCORE: 29
ADLS_ACUITY_SCORE: 20

## 2024-06-14 NOTE — OP NOTE
[facility]    Operative Note    Pre-operative diagnosis: Acute cholecystitis [K81.0]  Post-operative diagnosis Same as pre-operative diagnosis    Procedure: CHOLECYSTECTOMY, ROBOT-ASSISTED, LAPAROSCOPIC, USING DA DOMINGO XI, N/A - Abdomen    Surgeon: Surgeons and Role:     * Raul Ferrari DO - Primary  Anesthesia: General   Estimated Blood Loss: 10 mL     Drains: None  Specimens:   ID Type Source Tests Collected by Time Destination   1 : GALLBLADDER WITH STONE Tissue Gallbladder with Stone(s) SURGICAL PATHOLOGY EXAM Raul Ferrari DO 6/14/2024  8:27 AM      Findings:    - acute cholecystitis   -Operative cholangiogram with firefly to confirm anatomy    Complications: None.  Implants: * No implants in log *    Indication: 64-year-old female presenting with abdominal pain.  Patient was worked up and found to have symptomatic cholelithiasis versus acute cholecystitis.  Patient offered surgery for robotic cholecystectomy.  The risks and benefits of the procedure were explained detail to the patient. The risks include infection, bleeding, damage to the surrounding structures. Patient verbalized understanding provided consent to undergo the procedure above.       Procedure: Patient was brought back to the operating room and was placed on the operating table in the supine position.  The patient's extremities were padded and positioned in the usual fashion.  The patient then underwent anesthesia sedation and intubation.  The patient's abdomen was prepped and draped in the usual sterile fashion.  Prior to initiating the procedure, a timeout was completed.  All present were in agreement.    1% lidocaine with epinephrine was instilled at Ortega's point in the left upper quadrant.  A 15 blade was used to make a skin knick incision at Ortega's point.  A Veress needle was inserted into the abdomen and insufflation was initiated. An 8 mm trocar was inserted at the infraumbilical port site.  Insufflation was  then initiated.  Once insufflation was completed, a general survey was completed.  I could identify that the patient had no injury of the abdominal contents upon entering the abdomen.  Acute cholecystitis was found with the omentum encasing most of the gallbladder.    An 8 mm port was placed to the right of the umbilicus.  Two 8 mm ports were placed in the left abdominal quadrant.  These ports were placed under direct visualization.  The robot was then docked and the robotic instruments were then inserted into the abdomen under direct visualization.  The gallbladder was then grasped with tip up graspers and retracted cephalad.  The cystic duct and cystic artery were then carefully dissected and isolated with a combination of blunt dissection with the hook electrocautery.  Once the cystic artery and cystic duct were isolated the critical view was obtained.  Operative cholangiogram was completed to confirm anatomy with firefly.  Weck clips were used to clip across the cystic artery and duct.  The hook cautery was used to transect between the Weck clips of the cystic duct and cystic artery.  The gallbladder was removed off of the liver bed using electrocautery.  The gallbladder was then removed from the abdomen using an Endo Catch bag through the 8 mm left lower quadrant port.      Inspection of the liver bed revealed good hemostasis.  The fascia of the gallbladder extraction site was then closed using an 0 Vicryl suture with a suture passer.  A 3-0 Vicryl suture was used to perform deep dermal sutures at the 12 mm port site.  All surgical sites were then closed with a 4-0 Vicryl suture in a subcuticular fashion.  Surgical glue was used to reinforce all surgical skin incisions.  At the end of the procedure, a final count was completed.  I was told all sharps, sponges, instruments were accounted for.  The patient tolerated the procedure with no complications.  The patient was then extubated and brought back to the PACU  in stable condition.                    Raul Ferrari DO  General Surgeon  [facility]  Surgery [facility] - [address]  [place] [address]?  Office: [phone]  Employed by - [facility] [employer]  Pager: [phone]

## 2024-06-14 NOTE — H&P
General Surgery H&P  Maddy Torres MRN# [id number]   Age/Sex: 64 year old female YOB: 1960     Reason for visit: Acute cholecystitis       Referring physician: Dr. Lynch                    Assessment and Plan:   Assessment:  Acute cholecystitis    Plan:  -To the OR today for robotic cholecystectomy  - The risks and benefits of the procedure were explained detail to the patient. The risks include infection, bleeding, damage to the surrounding structures. Patient verbalized understanding provided consent to undergo the procedure above.            Chief Complaint:   Here for surgery     History is obtained from the patient    HPI:   Maddy Torres is a 64 year old female who presents robotic cholecystectomy.  Patient was recently seen in the ED for abdominal pain.  Patient was worked up and found to have acute cholecystitis versus symptomatic cholelithiasis.  The patient continued to have abdominal pain and was offered procedure of robotic cholecystectomy.  Currently patient still has pain in the right upper quadrant.  No nausea no vomiting.          Past Medical History:     Past Medical History:   Diagnosis Date    Biliary colic     Endometriosis     Family history of pseudocholinesterase deficiency     Gastroesophageal reflux disease     Hyperlipaemia     Insomnia     Malaise and fatigue     Obesity     PONV (postoperative nausea and vomiting)     Vitamin D deficiency               Past Surgical History:     Past Surgical History:   Procedure Laterality Date    COLONOSCOPY      LAPAROSCOPY,PELVIC,BIOPSY      X 2    OPERATIVE HYSTEROSCOPY WITH MORCELLATOR  12/17/2013    Procedure: OPERATIVE HYSTEROSCOPY WITH MORCELLATOR;  OPERATIVE HYSTEROSCOPY, POLYPECTOMY WITH MONTILLA AND NEPHEW 5.0 MM MORCELLATOR;  Surgeon: Jessica Johnston MD;  Location: [facility]    WISDOM TEETH[               Social History:    reports that she has never smoked. She does not have any smokeless tobacco history on file. She  "reports current alcohol use.           Family History:   History reviewed. No pertinent family history.           Allergies:     Allergies   Allergen Reactions    Cortisone Anxiety, Headache, Other (See Comments), Rash and Shortness Of Breath     Agitation after fasciitis injection.    Succinylcholine Other (See Comments) and Nausea and Vomiting     \"Could not wake up until the next day\"              Medications:     Prior to Admission medications    Medication Sig Start Date End Date Taking? Authorizing Provider   ALPRAZolam (XANAX PO) Take 0.5 mg by mouth   Yes Reported, Patient   Atorvastatin Calcium (LIPITOR PO) Take 10 mg by mouth daily   Yes Reported, Patient   calcium-vitamin D (CALTRATE) 600-400 MG-UNIT per tablet Take 1 tablet by mouth 2 times daily   Yes Reported, Patient   cephALEXin (KEFLEX) 500 MG capsule Take 1 capsule (500 mg) by mouth 2 times daily for 7 days 6/11/24 6/18/24 Yes Jane Campbell MD   Cetirizine HCl (ZYRTEC PO) Take 10 mg by mouth   Yes Reported, Patient   Cholecalciferol (VITAMIN D3 PO) Take by mouth daily   Yes Reported, Patient   fluticasone (FLONASE) 50 MCG/ACT nasal spray Spray 2 sprays into both nostrils daily   Yes Reported, Patient   MELATONIN PO Take 3 mg by mouth nightly as needed   Yes Reported, Patient   Multiple Vitamin (MULTI-VITAMIN DAILY PO)    Yes Reported, Patient   ondansetron (ZOFRAN ODT) 4 MG ODT tab Take 1 tablet (4 mg) by mouth every 8 hours as needed for nausea 6/27/23  Yes WallEde MD   oxyCODONE (ROXICODONE) 5 MG tablet Take 5 mg by mouth every 6 hours as needed for severe pain   Yes Reported, Patient   teriparatide, recombinant, (FORTEO) 600 MCG/2.4ML SOPN injection Inject 20 mcg Subcutaneous daily   Yes Reported, Patient   Zolpidem Tartrate (AMBIEN PO) Take 5 mg by mouth   Yes Reported, Patient   ibuprofen (ADVIL,MOTRIN) 400 MG tablet Take 2 tablets (800 mg) by mouth every 6 hours as needed 12/17/13   Jessica Johnston MD           "    Review of Systems:   A 12 point Review of Systems is negative other than noted in the HPI            Physical Exam:   Patient Vitals for the past 24 hrs:   BP Temp Temp src Pulse Resp SpO2 Weight   06/14/24 0558 119/80 98.5  F (36.9  C) Oral 80 18 97 % 79.8 kg (175 lb 14.4 oz)        No intake or output data in the 24 hours ending 06/14/24 0716   Constitutional:   awake, alert, cooperative, no apparent distress, and appears stated age       Eyes:   PERRL, conjunctiva/corneas clear, EOM's intact; no scleral edema or icterus noted        ENT:   Normocephalic, without obvious abnormality, atraumatic, Lips, mucosa, and tongue normal        Hematologic / Lymphatic:   No lymphadenopathy       Lungs:   Normal respiratory effort, no accessory muscle use       Cardiovascular:   Regular rate and rhythm       Abdomen:   Soft, nondistended, tender to palpation in the right upper quadrant.  Positive Ingram sign       Musculoskeletal:   No obvious swelling, bruising or deformity       Skin:   Skin color and texture normal for patient, no rashes or lesions              Data:            Raul Ferrari DO  General Surgeon  [facility]  Surgery [facility] - [address] [address]?  Office: [phone]  Employed by - [facility] [facility] [employer]  Pager: [phone]

## 2024-06-14 NOTE — ANESTHESIA PROCEDURE NOTES
Airway       Patient location during procedure: OR       Procedure Start/Stop Times: 6/14/2024 7:29 AM  Staff -        CRNA: Hi Mckee APRN CRNA       Performed By: CRNA  Consent for Airway        Urgency: elective  Indications and Patient Condition       Indications for airway management: shahla-procedural       Induction type:intravenous       Mask difficulty assessment: 1 - vent by mask    Final Airway Details       Final airway type: endotracheal airway       Successful airway: ETT - single and Oral  Endotracheal Airway Details        ETT size (mm): 7.0       Cuffed: yes       Successful intubation technique: direct laryngoscopy       DL Blade Type: MAC 3       Grade View of Cords: 1       Adjucts: stylet       Position: Right       Measured from: lips       Secured at (cm): 22       Bite block used: None    Post intubation assessment        Placement verified by: capnometry, equal breath sounds and chest rise        Number of attempts at approach: 1       Number of other approaches attempted: 0       Secured with: silk tape       Ease of procedure: easy       Dentition: Intact and Unchanged       Dental guard used and removed. Dental Guard Type: Proguard Red.    Medication(s) Administered   Medication Administration Time: 6/14/2024 7:29 AM

## 2024-06-14 NOTE — ANESTHESIA PREPROCEDURE EVALUATION
"Anesthesia Pre-Procedure Evaluation    Patient: Maddy Torres   MRN: [id number] : 1960        Procedure : Procedure(s):  CHOLECYSTECTOMY, ROBOT-ASSISTED, LAPAROSCOPIC, USING DA DOMINGO XI          Past Medical History:   Diagnosis Date    Biliary colic     Endometriosis     Gastroesophageal reflux disease     Hyperlipaemia     Insomnia     Malaise and fatigue     Obesity     PONV (postoperative nausea and vomiting)     Vitamin D deficiency       Past Surgical History:   Procedure Laterality Date    COLONOSCOPY      LAPAROSCOPY,PELVIC,BIOPSY      X 2    OPERATIVE HYSTEROSCOPY WITH MORCELLATOR  2013    Procedure: OPERATIVE HYSTEROSCOPY WITH MORCELLATOR;  OPERATIVE HYSTEROSCOPY, POLYPECTOMY WITH MONTILLA AND NEPHDuetto 5.0 MM MORCELLATOR;  Surgeon: Jessica Johnston MD;  [facility]: [facility]    WISDOM TEETH[        Allergies   Allergen Reactions    Cortisone Anxiety, Headache, Other (See Comments), Rash and Shortness Of Breath     Agitation after fasciitis injection.    Succinylcholine Other (See Comments) and Nausea and Vomiting     \"Could not wake up until the next day\"      Social History     Tobacco Use    Smoking status: Never    Smokeless tobacco: Not on file   Substance Use Topics    Alcohol use: Yes     Comment: SOCIALLY      Wt Readings from Last 1 Encounters:   24 79.8 kg (175 lb 14.4 oz)        Anesthesia Evaluation   Pt has had prior anesthetic. Type: General and MAC.    History of anesthetic complications  -  and PONV.  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    ROS/MED HX  ENT/Pulmonary:       Neurologic:    (-) no CVA and no TIA   Cardiovascular:       METS/Exercise Tolerance:     Hematologic:    (-) anemia   Musculoskeletal:    (-) arthritis   GI/Hepatic:     (+) GERD,         cholecystitis/cholelithiasis,          Renal/Genitourinary:    (-) renal disease   Endo:     (+)               Obesity,       Psychiatric/Substance Use:       Infectious Disease:       Malignancy:       Other:      " "      Physical Exam    Airway        Mallampati: II   TM distance: > 3 FB   Neck ROM: full     Respiratory Devices and Support         Dental       (+) Minor Abnormalities - some fillings, tiny chips    B=Bridge, C=Chipped, L=Loose, M=Missing    Cardiovascular          Rhythm and rate: regular     Pulmonary           breath sounds clear to auscultation           OUTSIDE LABS:  CBC:   Lab Results   Component Value Date    WBC 10.5 06/11/2024    WBC 10.7 06/09/2024    HGB 12.4 06/11/2024    HGB 13.2 06/09/2024    HCT 36.3 06/11/2024    HCT 38.1 06/09/2024     06/11/2024     06/09/2024     BMP:   Lab Results   Component Value Date     06/11/2024     06/09/2024    POTASSIUM 3.9 06/11/2024    POTASSIUM 3.9 06/09/2024    CHLORIDE 107 06/11/2024    CHLORIDE 104 06/09/2024    CO2 24 06/11/2024    CO2 23 06/09/2024    BUN 8.6 06/11/2024    BUN 10.5 06/09/2024    CR 0.86 06/11/2024    CR 0.78 06/09/2024     (H) 06/11/2024     (H) 06/09/2024     COAGS: No results found for: \"PTT\", \"INR\", \"FIBR\"  POC: No results found for: \"BGM\", \"HCG\", \"HCGS\"  HEPATIC:   Lab Results   Component Value Date    ALBUMIN 4.0 06/11/2024    PROTTOTAL 6.6 06/11/2024    ALT 23 06/11/2024    AST 21 06/11/2024    ALKPHOS 96 06/11/2024    BILITOTAL 0.3 06/11/2024     OTHER:   Lab Results   Component Value Date    MANOHAR 9.3 06/11/2024    LIPASE 77 (H) 06/11/2024       Anesthesia Plan    ASA Status:  2    NPO Status:  NPO Appropriate    Anesthesia Type: General.     - Airway: ETT   Induction: Intravenous.   Maintenance: Balanced.        Consents    Anesthesia Plan(s) and associated risks, benefits, and realistic alternatives discussed. Questions answered and patient/representative(s) expressed understanding.     - Discussed: Risks, Benefits and Alternatives for BOTH SEDATION and the PROCEDURE were discussed     - Discussed with:             Postoperative Care    Pain management: IV analgesics, Oral pain medications, " "Multi-modal analgesia.   PONV prophylaxis: Ondansetron (or other 5HT-3), Dexamethasone or Solumedrol, Background Propofol Infusion     Comments:    Other Comments: GA with ETT with rocuronium  Multimodal with precedex, dilaudid, toradol  Decadron/Zofran, background propofol, scopolamine patch           Rosa De Los Santos MD    I have reviewed the pertinent notes and labs in the chart from the past 30 days and (re)examined the patient.  Any updates or changes from those notes are reflected in this note.              # Obesity: Estimated body mass index is 30.19 kg/m  as calculated from the following:    Height as of 6/11/24: 1.626 m (5' 4\").    Weight as of this encounter: 79.8 kg (175 lb 14.4 oz).      "

## 2024-06-14 NOTE — ANESTHESIA POSTPROCEDURE EVALUATION
Patient: Maddy Torres    Procedure: Procedure(s):  CHOLECYSTECTOMY, ROBOT-ASSISTED, LAPAROSCOPIC, USING DA DOMINGO XI       Anesthesia Type:  General    Note:  Disposition: Outpatient   Postop Pain Control: Uneventful            Sign Out: Well controlled pain   PONV: No   Neuro/Psych: Uneventful            Sign Out: Acceptable/Baseline neuro status   Airway/Respiratory: Uneventful            Sign Out: Acceptable/Baseline resp. status   CV/Hemodynamics: Uneventful            Sign Out: Acceptable CV status; No obvious hypovolemia; No obvious fluid overload   Other NRE: NONE   DID A NON-ROUTINE EVENT OCCUR? No           Last vitals:  Vitals Value Taken Time   /67 06/14/24 0930   Temp 37  C (98.6  F) 06/14/24 0930   Pulse 83 06/14/24 0940   Resp 24 06/14/24 0937   SpO2 94 % 06/14/24 0940   Vitals shown include unfiled device data.    Electronically Signed By: Rosa De Los Santos MD  June 14, 2024  12:07 PM

## 2024-06-14 NOTE — DISCHARGE INSTRUCTIONS
Follow up: Please call us at [phone] to schedule an appointment at your convenience.      If you would prefer to follow up with us by phone please let us know so that we may contact you 2-3 weeks following your procedure.         Diet: Regular diet.  Foods high infiber are recommended with 8 to 10 glasses of fluids each day.     Patients can have difficulty with constipation following surgery, due in part to the administration of narcotic medications.  If you are suffering with constipation, you should avoid foods such as hard cheeses or red meat.      Activity: You should continue to be active at home, including ambulating frequently.  If possible try to limit the amount of time spent in bed.     Restrictions: No lifting more than 10 pounds for the next 2 weeks    Wound / drain care: Your incisions are closed using absorbale sutures.  The skin is sealed with a surgical glue.  Do not peal the glue off.  Please allow the glue to peal off on its own.      It is normal to have a small rim of red present around the incisions. This should not, however, extend beyond 1/4 inch from the incision.  If your incisions become increasingly tender, red, or draining, please contact us.        Bathing: You may shower after 24 hours from surgery.  It is ok to get your incisions wet, but avoid rubbing them.  Avoid soaking in bath tubs, or swimming in lakes, pools, or streams for 4 weeks following surgery.

## 2024-06-14 NOTE — ANESTHESIA CARE TRANSFER NOTE
Patient: Maddy Torres    Procedure: Procedure(s):  CHOLECYSTECTOMY, ROBOT-ASSISTED, LAPAROSCOPIC, USING DA DOMINGO XI       Diagnosis: Acute cholecystitis [K81.0]  Diagnosis Additional Information: No value filed.    Anesthesia Type:   General     Note:    Oropharynx: oropharynx clear of all foreign objects and spontaneously breathing  Level of Consciousness: drowsy  Oxygen Supplementation: face mask  Level of Supplemental Oxygen (L/min / FiO2): 6  Independent Airway: airway patency satisfactory and stable  Dentition: dentition unchanged  Vital Signs Stable: post-procedure vital signs reviewed and stable  Report to RN Given: handoff report given  Patient transferred to: PACU    Handoff Report: Identifed the Patient, Identified the Reponsible Provider, Reviewed the pertinent medical history, Discussed the surgical course, Reviewed Intra-OP anesthesia mangement and issues during anesthesia, Set expectations for post-procedure period and Allowed opportunity for questions and acknowledgement of understanding      Vitals:  Vitals Value Taken Time   /76 06/14/24 0850   Temp 37  C (98.6  F) 06/14/24 0849   Pulse 79 06/14/24 0850   Resp 15 06/14/24 0850   SpO2 98 % 06/14/24 0850   Vitals shown include unfiled device data.    Electronically Signed By: CAL Bobby CRNA  June 14, 2024  8:52 AM

## 2024-06-17 LAB
PATH REPORT.COMMENTS IMP SPEC: NORMAL
PATH REPORT.COMMENTS IMP SPEC: NORMAL
PATH REPORT.FINAL DX SPEC: NORMAL
PATH REPORT.GROSS SPEC: NORMAL
PATH REPORT.MICROSCOPIC SPEC OTHER STN: NORMAL
PATH REPORT.RELEVANT HX SPEC: NORMAL
PHOTO IMAGE: NORMAL

## 2024-06-18 ENCOUNTER — TELEPHONE (OUTPATIENT)
Dept: SURGERY | Facility: CLINIC | Age: 64
End: 2024-06-18
Payer: COMMERCIAL

## 2024-06-18 NOTE — TELEPHONE ENCOUNTER
Patient is s/p robot-assisted Laparoscopic Cholecystectomy on 6/14/2024.     Patient returned call and reported that she is doing okay. She states that she hasn't been able to have a bowel movement since last Thursday. She has been increasing water intake and using Colace BID. RN advised that she trial Miralax, Docusate, and if no BM within the next couple of days to use a fleet enema or suppository if it gets uncomfortable. Also reviewed high fiber diet options and ambulating frequently.     Patient also states that she is having some burning pain on one of her left incisions. Denies any swelling, redness, drainage, bleeding, or fever/chills. Reports that pain is aggravated with movement or when she coughs. RN advised patient to continue with her pain medication regimen, alternating Tylenol and Ibuprofen and using Oxycodone PRN. Also recommended using ice packs as well. Advised that what she could be feeling is more nerve pain and should resolve in a couple of days. Reviewed signs of infection with patient and when to call clinic back. Patient was encouraged to call clinic back later this week for check-in. Patient verbalized good understanding. No further questions or concerns.    Steph HILL RN, BSN

## 2024-06-18 NOTE — TELEPHONE ENCOUNTER
Called patient for post-op check in. No answer. Left message for patient to call clinic if having any issues.

## 2024-06-24 PROBLEM — M21.611 BUNION OF RIGHT FOOT: Status: ACTIVE | Noted: 2022-08-22

## 2024-06-24 PROBLEM — D12.0 BENIGN NEOPLASM OF CECUM: Status: ACTIVE | Noted: 2022-06-08

## 2024-06-24 PROBLEM — R10.13 EPIGASTRIC PAIN: Status: ACTIVE | Noted: 2018-12-03

## 2024-06-24 PROBLEM — F41.1 GENERALIZED ANXIETY DISORDER: Status: ACTIVE | Noted: 2022-06-11

## 2024-06-24 PROBLEM — F51.01 INSOMNIA, IDIOPATHIC: Status: ACTIVE | Noted: 2018-12-03

## 2024-06-24 PROBLEM — K29.70 GASTRITIS: Status: ACTIVE | Noted: 2018-12-03

## 2024-06-24 PROBLEM — Z86.0101 HISTORY OF ADENOMATOUS POLYP OF COLON: Status: ACTIVE | Noted: 2022-06-11

## 2024-06-24 PROBLEM — J45.20 MILD INTERMITTENT ASTHMA: Status: ACTIVE | Noted: 2018-12-03

## 2024-06-24 PROBLEM — K64.9 HEMORRHOIDS: Status: ACTIVE | Noted: 2022-06-06

## 2024-06-24 PROBLEM — K57.30 DIVERTICULOSIS OF COLON: Status: ACTIVE | Noted: 2017-06-01

## 2024-06-24 PROBLEM — R73.01 IMPAIRED FASTING GLUCOSE: Status: ACTIVE | Noted: 2019-06-05

## 2024-06-25 ENCOUNTER — LAB (OUTPATIENT)
Dept: LAB | Facility: CLINIC | Age: 64
End: 2024-06-25
Payer: COMMERCIAL

## 2024-06-25 ENCOUNTER — OFFICE VISIT (OUTPATIENT)
Dept: SURGERY | Facility: CLINIC | Age: 64
End: 2024-06-25
Payer: COMMERCIAL

## 2024-06-25 DIAGNOSIS — Z90.49 S/P CHOLECYSTECTOMY: ICD-10-CM

## 2024-06-25 DIAGNOSIS — Z90.49 S/P CHOLECYSTECTOMY: Primary | ICD-10-CM

## 2024-06-25 LAB
ALBUMIN SERPL BCG-MCNC: 4 G/DL (ref 3.5–5.2)
ALP SERPL-CCNC: 334 U/L (ref 40–150)
ALT SERPL W P-5'-P-CCNC: 388 U/L (ref 0–50)
ANION GAP SERPL CALCULATED.3IONS-SCNC: 11 MMOL/L (ref 7–15)
AST SERPL W P-5'-P-CCNC: 138 U/L (ref 0–45)
BILIRUB DIRECT SERPL-MCNC: 0.54 MG/DL (ref 0–0.3)
BILIRUB SERPL-MCNC: 1.1 MG/DL
BUN SERPL-MCNC: 13.8 MG/DL (ref 8–23)
CALCIUM SERPL-MCNC: 9.2 MG/DL (ref 8.8–10.2)
CHLORIDE SERPL-SCNC: 101 MMOL/L (ref 98–107)
CREAT SERPL-MCNC: 0.73 MG/DL (ref 0.51–0.95)
DEPRECATED HCO3 PLAS-SCNC: 24 MMOL/L (ref 22–29)
EGFRCR SERPLBLD CKD-EPI 2021: >90 ML/MIN/1.73M2
ERYTHROCYTE [DISTWIDTH] IN BLOOD BY AUTOMATED COUNT: 12.2 % (ref 10–15)
GLUCOSE SERPL-MCNC: 117 MG/DL (ref 70–99)
HCT VFR BLD AUTO: 38.2 % (ref 35–47)
HGB BLD-MCNC: 13.2 G/DL (ref 11.7–15.7)
LIPASE SERPL-CCNC: 75 U/L (ref 13–60)
MCH RBC QN AUTO: 30.1 PG (ref 26.5–33)
MCHC RBC AUTO-ENTMCNC: 34.6 G/DL (ref 31.5–36.5)
MCV RBC AUTO: 87 FL (ref 78–100)
PLATELET # BLD AUTO: 363 10E3/UL (ref 150–450)
POTASSIUM SERPL-SCNC: 4.1 MMOL/L (ref 3.4–5.3)
PROT SERPL-MCNC: 7.1 G/DL (ref 6.4–8.3)
RBC # BLD AUTO: 4.39 10E6/UL (ref 3.8–5.2)
SODIUM SERPL-SCNC: 136 MMOL/L (ref 135–145)
WBC # BLD AUTO: 14.5 10E3/UL (ref 4–11)

## 2024-06-25 PROCEDURE — 83690 ASSAY OF LIPASE: CPT

## 2024-06-25 PROCEDURE — 82248 BILIRUBIN DIRECT: CPT

## 2024-06-25 PROCEDURE — 36415 COLL VENOUS BLD VENIPUNCTURE: CPT

## 2024-06-25 PROCEDURE — 99024 POSTOP FOLLOW-UP VISIT: CPT | Performed by: SURGERY

## 2024-06-25 PROCEDURE — 80053 COMPREHEN METABOLIC PANEL: CPT

## 2024-06-25 PROCEDURE — 85027 COMPLETE CBC AUTOMATED: CPT

## 2024-06-25 RX ORDER — ONDANSETRON 4 MG/1
4 TABLET, ORALLY DISINTEGRATING ORAL EVERY 8 HOURS PRN
Qty: 20 TABLET | Refills: 0 | Status: SHIPPED | OUTPATIENT
Start: 2024-06-25

## 2024-06-25 NOTE — LETTER
6/25/2024      Maddy Torres  [address] [place] [address]      Dear Colleague,    Thank you for referring your patient, Maddy Torres, to the [facility] [place]. Please see a copy of my visit note below.    General Surgery Clinic - Postop follow up  Maddy Torres MRN# [id number]   Age/Sex: 64 year old female YOB: 1960     Reason for visit: Post op visit                           Assessment and Plan:   Assessment:  1.  Status postcholecystectomy  2.  Abdominal pain    64-year-old female status post robotic cholecystectomy on 6/14/2024.  Patient still with some abdominal pain which is expected after having an acute cholecystitis.  For the most part, the patient is healing as expected.  The pathology was discussed with the patient showing acute cholecystitis with necrosis.    Plan:  -We will order labs to recheck to make sure that the patient is otherwise doing well.  -I have ordered the patient to have Zofran and Norco for pain control            Chief Complaint:     Chief Complaint   Patient presents with     Post-Op - General Surgery        History is obtained from the patient    HPI:   Maddy Torres is a 64 year old female who presents to the general surgery team today for clinical postop evaluation after a robotic cholecystectomy on 6/14/2024.  The patient is doing better but she did have a lot of abdominal pain postop.  Patient states that she attempted regular diet but still had some nausea and had abdominal pain when she was eating.  As result she has been limiting her food intake.  Otherwise she has no fevers no chills.  Her abdominal incisions are better.  States that the abdominal pain that she has is different from her initial acute cholecystitis pain.          Past Medical History:     Past Medical History:   Diagnosis Date     Biliary colic      Endometriosis      Family history of pseudocholinesterase deficiency       "Gastroesophageal reflux disease      Hyperlipaemia      Insomnia      Malaise and fatigue      Obesity      PONV (postoperative nausea and vomiting)      Vitamin D deficiency               Past Surgical History:     Past Surgical History:   Procedure Laterality Date     COLONOSCOPY       LAPAROSCOPIC CHOLECYSTECTOMY N/A 6/14/2024    Procedure: CHOLECYSTECTOMY, ROBOT-ASSISTED, LAPAROSCOPIC, USING DA DOMINGO XI;  Surgeon: Raul Ferrari DO;  Location: [facility] OR     LAPAROSCOPY,PELVIC,BIOPSY      X 2     OPERATIVE HYSTEROSCOPY WITH MORCELLATOR  12/17/2013    Procedure: OPERATIVE HYSTEROSCOPY WITH MORCELLATOR;  OPERATIVE HYSTEROSCOPY, POLYPECTOMY WITH MONTILLA AND NEPHEW 5.0 MM MORCELLATOR;  Surgeon: Jessica Johnston MD;  Location: [facility]     WISDOM TEETH[               Social History:    reports that she has never smoked. She does not have any smokeless tobacco history on file. She reports current alcohol use.           Family History:   No family history on file.           Allergies:     Allergies   Allergen Reactions     Cortisone Anxiety, Headache, Other (See Comments), Rash and Shortness Of Breath     Agitation after fasciitis injection.     Pollen Extract Other (See Comments), Headache, Shortness Of Breath and Difficulty breathing     Betula Alba Oil Other (See Comments)     Mixed Grasses Other (See Comments)     Mixed Ragweed Other (See Comments)     Ragweeds Other (See Comments)     Succinylcholine Other (See Comments) and Nausea and Vomiting     \"Could not wake up until the next day\"     White Birch Other (See Comments)              Medications:     Prior to Admission medications    Medication Sig Start Date End Date Taking? Authorizing Provider   ALPRAZolam (XANAX PO) Take 0.5 mg by mouth   Yes Reported, Patient   Atorvastatin Calcium (LIPITOR PO) Take 10 mg by mouth daily   Yes Reported, Patient   calcium-vitamin D (CALTRATE) 600-400 MG-UNIT per tablet Take 1 tablet by mouth 2 times daily   Yes Reported, " Patient   Cetirizine HCl (ZYRTEC PO) Take 10 mg by mouth   Yes Reported, Patient   Cholecalciferol (VITAMIN D3 PO) Take by mouth daily   Yes Reported, Patient   docusate sodium (COLACE) 100 MG capsule Take 1 capsule (100 mg) by mouth 2 times daily 6/14/24  Yes Raul Ferrari,    fluticasone (FLONASE) 50 MCG/ACT nasal spray Spray 2 sprays into both nostrils daily   Yes Reported, Patient   ibuprofen (ADVIL,MOTRIN) 400 MG tablet Take 2 tablets (800 mg) by mouth every 6 hours as needed 12/17/13  Yes Jessica Johnston MD   MELATONIN PO Take 3 mg by mouth nightly as needed   Yes Reported, Patient   Multiple Vitamin (MULTI-VITAMIN DAILY PO)    Yes Reported, Patient   ondansetron (ZOFRAN ODT) 4 MG ODT tab Take 1 tablet (4 mg) by mouth every 8 hours as needed for nausea 6/27/23  Yes Ede Bowles MD   oxyCODONE (ROXICODONE) 5 MG tablet Take 5 mg by mouth every 6 hours as needed for severe pain   Yes Reported, Patient   teriparatide, recombinant, (FORTEO) 600 MCG/2.4ML SOPN injection Inject 20 mcg Subcutaneous daily   Yes Reported, Patient   Zolpidem Tartrate (AMBIEN PO) Take 5 mg by mouth   Yes Reported, Patient              Review of Systems:   A 12 point Review of Systems is negative other than noted in the HPI            Physical Exam:   No data found.     [unfilled]   Constitutional:   awake, alert, cooperative, no apparent distress, and appears stated age       Eyes:   PERRL, conjunctiva/corneas clear, EOM's intact; no scleral edema or icterus noted        ENT:   Normocephalic, without obvious abnormality, atraumatic, Lips, mucosa, and tongue normal        Hematologic / Lymphatic:   No lymphadenopathy       Lungs:   Normal respiratory effort, no accessory muscle use       Cardiovascular:   Regular rate and rhythm       Abdomen:   Soft, nondistended, nontender to palpation.  Incisions are well healed.        Musculoskeletal:   No obvious swelling, bruising or deformity       Skin:   Skin color and  texture normal for patient, no rashes or lesions              Data:            DO Raul Huffman DO  General Surgeon  [facility]  Surgery [address] [address]?  Office: [phone]  Employed by - [facility]  Pager: [phone]       Again, thank you for allowing me to participate in the care of your patient.        Sincerely,        Raul Ferrari DO

## 2024-06-25 NOTE — PROGRESS NOTES
General Surgery Clinic - Postop follow up  Maddy Torres MRN# [id number]   Age/Sex: 64 year old female YOB: 1960     Reason for visit: Post op visit                           Assessment and Plan:   Assessment:  1.  Status postcholecystectomy  2.  Abdominal pain    64-year-old female status post robotic cholecystectomy on 6/14/2024.  Patient still with some abdominal pain which is expected after having an acute cholecystitis.  For the most part, the patient is healing as expected.  The pathology was discussed with the patient showing acute cholecystitis with necrosis.    Plan:  -We will order labs to recheck to make sure that the patient is otherwise doing well.  -I have ordered the patient to have Zofran and Norco for pain control            Chief Complaint:     Chief Complaint   Patient presents with    Post-Op - General Surgery        History is obtained from the patient    HPI:   Maddy Torres is a 64 year old female who presents to the general surgery team today for clinical postop evaluation after a robotic cholecystectomy on 6/14/2024.  The patient is doing better but she did have a lot of abdominal pain postop.  Patient states that she attempted regular diet but still had some nausea and had abdominal pain when she was eating.  As result she has been limiting her food intake.  Otherwise she has no fevers no chills.  Her abdominal incisions are better.  States that the abdominal pain that she has is different from her initial acute cholecystitis pain.          Past Medical History:     Past Medical History:   Diagnosis Date    Biliary colic     Endometriosis     Family history of pseudocholinesterase deficiency     Gastroesophageal reflux disease     Hyperlipaemia     Insomnia     Malaise and fatigue     Obesity     PONV (postoperative nausea and vomiting)     Vitamin D deficiency               Past Surgical History:     Past Surgical History:   Procedure Laterality Date    COLONOSCOPY       "LAPAROSCOPIC CHOLECYSTECTOMY N/A 6/14/2024    Procedure: CHOLECYSTECTOMY, ROBOT-ASSISTED, LAPAROSCOPIC, USING DA DOMINGO XI;  Surgeon: Raul Ferrari DO;  Location: [facility] OR    LAPAROSCOPY,PELVIC,BIOPSY      X 2    OPERATIVE HYSTEROSCOPY WITH MORCELLATOR  12/17/2013    Procedure: OPERATIVE HYSTEROSCOPY WITH MORCELLATOR;  OPERATIVE HYSTEROSCOPY, POLYPECTOMY WITH MONTILLA AND NEPHKENNETH 5.0 MM MORCELLATOR;  Surgeon: Jessica Johnston MD;  Location: [facility]    WISDOM TEETH[               Social History:    reports that she has never smoked. She does not have any smokeless tobacco history on file. She reports current alcohol use.           Family History:   No family history on file.           Allergies:     Allergies   Allergen Reactions    Cortisone Anxiety, Headache, Other (See Comments), Rash and Shortness Of Breath     Agitation after fasciitis injection.    Pollen Extract Other (See Comments), Headache, Shortness Of Breath and Difficulty breathing    Betula Alba Oil Other (See Comments)    Mixed Grasses Other (See Comments)    Mixed Ragweed Other (See Comments)    Ragweeds Other (See Comments)    Succinylcholine Other (See Comments) and Nausea and Vomiting     \"Could not wake up until the next day\"    White Birch Other (See Comments)              Medications:     Prior to Admission medications    Medication Sig Start Date End Date Taking? Authorizing Provider   ALPRAZolam (XANAX PO) Take 0.5 mg by mouth   Yes Reported, Patient   Atorvastatin Calcium (LIPITOR PO) Take 10 mg by mouth daily   Yes Reported, Patient   calcium-vitamin D (CALTRATE) 600-400 MG-UNIT per tablet Take 1 tablet by mouth 2 times daily   Yes Reported, Patient   Cetirizine HCl (ZYRTEC PO) Take 10 mg by mouth   Yes Reported, Patient   Cholecalciferol (VITAMIN D3 PO) Take by mouth daily   Yes Reported, Patient   docusate sodium (COLACE) 100 MG capsule Take 1 capsule (100 mg) by mouth 2 times daily 6/14/24  Yes Raul Ferrari DO   fluticasone (FLONASE) 50 " MCG/ACT nasal spray Spray 2 sprays into both nostrils daily   Yes Reported, Patient   ibuprofen (ADVIL,MOTRIN) 400 MG tablet Take 2 tablets (800 mg) by mouth every 6 hours as needed 12/17/13  Yes Jessica Johnston MD   MELATONIN PO Take 3 mg by mouth nightly as needed   Yes Reported, Patient   Multiple Vitamin (MULTI-VITAMIN DAILY PO)    Yes Reported, Patient   ondansetron (ZOFRAN ODT) 4 MG ODT tab Take 1 tablet (4 mg) by mouth every 8 hours as needed for nausea 6/27/23  Yes Ede Bowles MD   oxyCODONE (ROXICODONE) 5 MG tablet Take 5 mg by mouth every 6 hours as needed for severe pain   Yes Reported, Patient   teriparatide, recombinant, (FORTEO) 600 MCG/2.4ML SOPN injection Inject 20 mcg Subcutaneous daily   Yes Reported, Patient   Zolpidem Tartrate (AMBIEN PO) Take 5 mg by mouth   Yes Reported, Patient              Review of Systems:   A 12 point Review of Systems is negative other than noted in the HPI            Physical Exam:   No data found.     [unfilled]   Constitutional:   awake, alert, cooperative, no apparent distress, and appears stated age       Eyes:   PERRL, conjunctiva/corneas clear, EOM's intact; no scleral edema or icterus noted        ENT:   Normocephalic, without obvious abnormality, atraumatic, Lips, mucosa, and tongue normal        Hematologic / Lymphatic:   No lymphadenopathy       Lungs:   Normal respiratory effort, no accessory muscle use       Cardiovascular:   Regular rate and rhythm       Abdomen:   Soft, nondistended, nontender to palpation.  Incisions are well healed.        Musculoskeletal:   No obvious swelling, bruising or deformity       Skin:   Skin color and texture normal for patient, no rashes or lesions              Data:            Raul Ferrari, DO Raul Ferrari, DO  General Surgeon  [facility]  Surgery [facility] - [address]  [place] [address]?  Office: [phone]  Employed by - [facility]  Services  Pager: [phone]

## 2024-06-26 DIAGNOSIS — Z90.49 S/P CHOLECYSTECTOMY: Primary | ICD-10-CM

## 2024-06-26 RX ORDER — OXYCODONE HYDROCHLORIDE 5 MG/1
5 TABLET ORAL EVERY 6 HOURS PRN
Qty: 12 TABLET | Refills: 0 | Status: SHIPPED | OUTPATIENT
Start: 2024-06-26 | End: 2024-06-29

## 2024-06-26 RX ORDER — ONDANSETRON 4 MG/1
4 TABLET, ORALLY DISINTEGRATING ORAL EVERY 8 HOURS PRN
Qty: 20 TABLET | Refills: 0 | Status: SHIPPED | OUTPATIENT
Start: 2024-06-26

## 2024-06-26 NOTE — RESULT ENCOUNTER NOTE
Called patient with results.  Patient has mildly elevated leukocytosis of 14 with some mildly elevated LFTs.  Bili's were mildly elevated and the lipase was also mildly elevated.  It is possible that the patient could have passed a stone in the common bile duct or the patient could have sludge or a common duct stone.  As result, I have ordered for pain medication, Augmentin for antibiotic coverage and an MRCP to rule out choledocholithiasis.    Raul Ferrari DO  General Surgeon  [facility]  Surgery [facility] - [address] [address]?  Office: [phone]  Employed by - [facility] [employer] [facility]  Pager: [phone]

## 2024-06-27 ENCOUNTER — TELEPHONE (OUTPATIENT)
Dept: SURGERY | Facility: CLINIC | Age: 64
End: 2024-06-27
Payer: COMMERCIAL

## 2024-06-27 DIAGNOSIS — Z90.49 S/P CHOLECYSTECTOMY: Primary | ICD-10-CM

## 2024-06-27 NOTE — TELEPHONE ENCOUNTER
Patient states pharmacy did not have Rx on file for Augmentin that Dr. Ferrari sent.    Per chart review, Dr. Ferrari would like patient to be on Augmentin for antibiotic coverage based upon patient's lab work completed on 6/25/2024. No Rx was sent to pharmacy.     Steph HILL RN, BSN

## 2024-06-28 DIAGNOSIS — Z90.49 S/P LAPAROSCOPIC CHOLECYSTECTOMY: Primary | ICD-10-CM

## 2024-06-28 NOTE — TELEPHONE ENCOUNTER
Left message to patient updating her that Augmentin Rx was sent to the pharmacy yesterday. Order for MRCP also placed today. Provided imaging scheduling number for patient to schedule, and also invited patient to call clinic back if needing assistance in scheduling MRCP.     Steph HILL RN, BSN

## 2024-07-02 ENCOUNTER — HOSPITAL ENCOUNTER (OUTPATIENT)
Dept: MRI IMAGING | Facility: CLINIC | Age: 64
Discharge: HOME | End: 2024-07-02
Attending: SURGERY | Admitting: SURGERY
Payer: COMMERCIAL

## 2024-07-02 DIAGNOSIS — Z90.49 S/P LAPAROSCOPIC CHOLECYSTECTOMY: ICD-10-CM

## 2024-07-02 PROCEDURE — 74183 MRI ABD W/O CNTR FLWD CNTR: CPT

## 2024-07-02 PROCEDURE — 255N000002 HC RX 255 OP 636: Performed by: SURGERY

## 2024-07-02 PROCEDURE — A9585 GADOBUTROL INJECTION: HCPCS | Performed by: SURGERY

## 2024-07-02 RX ORDER — GADOBUTROL 604.72 MG/ML
8 INJECTION INTRAVENOUS ONCE
Status: COMPLETED | OUTPATIENT
Start: 2024-07-02 | End: 2024-07-02

## 2024-07-02 RX ADMIN — GADOBUTROL 8 ML: 604.72 INJECTION INTRAVENOUS at 14:18

## 2024-07-02 NOTE — RESULT ENCOUNTER NOTE
Called patient with results.  The MRCP as possible concerns for retained stones.  At the same time, the patient does have elevated LFTs and mildly elevated bili's.  Along with the mildly elevated lipase, I do suspect that the patient could have retained stones.  I am sending the patient to [facility] for evaluation for ERCP.    Raul Ferrari DO  General Surgeon  [facility]  Surgery [facility] - [address] [address]?  Office: [phone]  Employed by - [facility]  Pager: [phone]

## 2024-07-03 ENCOUNTER — TELEPHONE (OUTPATIENT)
Dept: SURGERY | Facility: CLINIC | Age: 64
End: 2024-07-03
Payer: COMMERCIAL

## 2024-07-03 ENCOUNTER — MYC MEDICAL ADVICE (OUTPATIENT)
Dept: SURGERY | Facility: CLINIC | Age: 64
End: 2024-07-03
Payer: COMMERCIAL

## 2024-07-03 NOTE — TELEPHONE ENCOUNTER
called and left a message with MR results yesterday.  Patient got the message but she has questions and would like a call back.    Thanks!

## 2024-07-05 NOTE — TELEPHONE ENCOUNTER
I called Maddy and answered all her questions. Her referral has been sent to [facility] and will be contacted by their scheduling department after review or her records from Dr. Ferrari.       [facility]     Becky Garcia  RN, BSN  [facility]  General Surgery  [facility] [address] [place] [address]  [email or web address]   Office:[phone]  Employed by [facility] [employer],

## 2024-07-11 DIAGNOSIS — R74.01 TRANSAMINITIS: Primary | ICD-10-CM

## 2024-07-15 ENCOUNTER — LAB (OUTPATIENT)
Dept: LAB | Facility: CLINIC | Age: 64
End: 2024-07-15
Payer: COMMERCIAL

## 2024-07-15 DIAGNOSIS — R74.01 TRANSAMINITIS: ICD-10-CM

## 2024-07-15 LAB
ALBUMIN SERPL BCG-MCNC: 4.4 G/DL (ref 3.5–5.2)
ALP SERPL-CCNC: 131 U/L (ref 40–150)
ALT SERPL W P-5'-P-CCNC: 23 U/L (ref 0–50)
ANION GAP SERPL CALCULATED.3IONS-SCNC: 10 MMOL/L (ref 7–15)
AST SERPL W P-5'-P-CCNC: 22 U/L (ref 0–45)
BILIRUB DIRECT SERPL-MCNC: <0.2 MG/DL (ref 0–0.3)
BILIRUB SERPL-MCNC: 0.4 MG/DL
BILIRUB SERPL-MCNC: 0.4 MG/DL
BUN SERPL-MCNC: 13.9 MG/DL (ref 8–23)
CALCIUM SERPL-MCNC: 9.8 MG/DL (ref 8.8–10.2)
CHLORIDE SERPL-SCNC: 104 MMOL/L (ref 98–107)
CREAT SERPL-MCNC: 0.87 MG/DL (ref 0.51–0.95)
EGFRCR SERPLBLD CKD-EPI 2021: 74 ML/MIN/1.73M2
ERYTHROCYTE [DISTWIDTH] IN BLOOD BY AUTOMATED COUNT: 12.9 % (ref 10–15)
GLUCOSE SERPL-MCNC: 92 MG/DL (ref 70–99)
HCO3 SERPL-SCNC: 27 MMOL/L (ref 22–29)
HCT VFR BLD AUTO: 40.9 % (ref 35–47)
HGB BLD-MCNC: 13.8 G/DL (ref 11.7–15.7)
LIPASE SERPL-CCNC: 39 U/L (ref 13–60)
MCH RBC QN AUTO: 29.9 PG (ref 26.5–33)
MCHC RBC AUTO-ENTMCNC: 33.7 G/DL (ref 31.5–36.5)
MCV RBC AUTO: 89 FL (ref 78–100)
PLATELET # BLD AUTO: 264 10E3/UL (ref 150–450)
POTASSIUM SERPL-SCNC: 4.3 MMOL/L (ref 3.4–5.3)
PROT SERPL-MCNC: 6.9 G/DL (ref 6.4–8.3)
RBC # BLD AUTO: 4.62 10E6/UL (ref 3.8–5.2)
SODIUM SERPL-SCNC: 141 MMOL/L (ref 135–145)
WBC # BLD AUTO: 6.3 10E3/UL (ref 4–11)

## 2024-07-15 PROCEDURE — 85027 COMPLETE CBC AUTOMATED: CPT

## 2024-07-15 PROCEDURE — 80053 COMPREHEN METABOLIC PANEL: CPT

## 2024-07-15 PROCEDURE — 82248 BILIRUBIN DIRECT: CPT

## 2024-07-15 PROCEDURE — 36415 COLL VENOUS BLD VENIPUNCTURE: CPT

## 2024-07-15 PROCEDURE — 83690 ASSAY OF LIPASE: CPT

## 2024-07-18 ENCOUNTER — MYC MEDICAL ADVICE (OUTPATIENT)
Dept: SURGERY | Facility: CLINIC | Age: 64
End: 2024-07-18
Payer: COMMERCIAL

## 2024-07-22 NOTE — RESULT ENCOUNTER NOTE
Messaged patient with results.  Results have improved.  Patient will be following with [facility] for any further recommendations.    Raul Ferrari DO  General Surgeon  [facility]  Surgery [facility] - [address] [address]?  Office: [phone]  Employed by - [facility]  Pager: [phone]

## 2024-09-28 ENCOUNTER — HEALTH MAINTENANCE LETTER (OUTPATIENT)
Age: 64
End: 2024-09-28

## (undated) DEVICE — DRAPE U SPLIT 74X120" 29440

## (undated) DEVICE — PREP CHLORAPREP 26ML TINTED HI-LITE ORANGE 930815

## (undated) DEVICE — GLOVE UNDER INDICATOR PI SZ 7.0 LF 41670

## (undated) DEVICE — DRAPE SHEET REV FOLD 3/4 9349

## (undated) DEVICE — NEEDLE HYPO 18X1-1/2 SAFETY 305918

## (undated) DEVICE — DECANTER VIAL 2006S

## (undated) DEVICE — LUBRICANT INST ELECTROLUBE EL101

## (undated) DEVICE — BLADE KNIFE SURG 11 371111

## (undated) DEVICE — TUBING SMOKE EVAC PNEUMOCLEAR HIGH FLOW 0620050250

## (undated) DEVICE — SYR 50ML SLIP TIP W/O NDL 309654

## (undated) DEVICE — DRAPE SHEET TABLE COVER KC 42301*

## (undated) DEVICE — DAVINCI XI DRAPE COLUMN 470341

## (undated) DEVICE — CUSTOM PACK LAP CHOLE SBA5BLCHEA

## (undated) DEVICE — SUTURE VICRYL+ 0 27IN CT-1 UND VCP260H

## (undated) DEVICE — DAVINCI XI SUCTION IRRIGATOR ENDOWRIST 480299

## (undated) DEVICE — SU DERMABOND ADVANCED .7ML DNX12

## (undated) DEVICE — DAVINCI XI DRAPE ARM 470015

## (undated) DEVICE — NDL INSUFFLATION 13GA 120MM C2201

## (undated) DEVICE — CLIP ENDO HEMO-LOC PURPLE LG 544240

## (undated) DEVICE — ENDO POUCH UNIVERSAL RETRIEVAL SYSTEM INZII 5MM CD003

## (undated) DEVICE — DAVINCI XI OBTURATOR BLADELESS 8MM 470359

## (undated) DEVICE — SU VICRYL+ 3-0 27IN SH UND VCP416H

## (undated) DEVICE — SOL WATER IRRIG 1000ML BOTTLE 2F7114

## (undated) DEVICE — DAVINCI XI SEAL UNIVERSAL 5-12MM 470500

## (undated) RX ORDER — KETOROLAC TROMETHAMINE 30 MG/ML
INJECTION, SOLUTION INTRAMUSCULAR; INTRAVENOUS
Status: DISPENSED
Start: 2024-06-14

## (undated) RX ORDER — FENTANYL CITRATE-0.9 % NACL/PF 10 MCG/ML
PLASTIC BAG, INJECTION (ML) INTRAVENOUS
Status: DISPENSED
Start: 2024-06-14

## (undated) RX ORDER — LIDOCAINE HYDROCHLORIDE 10 MG/ML
INJECTION, SOLUTION EPIDURAL; INFILTRATION; INTRACAUDAL; PERINEURAL
Status: DISPENSED
Start: 2024-06-14

## (undated) RX ORDER — PROPOFOL 10 MG/ML
INJECTION, EMULSION INTRAVENOUS
Status: DISPENSED
Start: 2024-06-14

## (undated) RX ORDER — LIDOCAINE HYDROCHLORIDE AND EPINEPHRINE 10; 10 MG/ML; UG/ML
INJECTION, SOLUTION INFILTRATION; PERINEURAL
Status: DISPENSED
Start: 2024-06-14

## (undated) RX ORDER — DEXAMETHASONE SODIUM PHOSPHATE 10 MG/ML
INJECTION, SOLUTION INTRAMUSCULAR; INTRAVENOUS
Status: DISPENSED
Start: 2024-06-14

## (undated) RX ORDER — ONDANSETRON 2 MG/ML
INJECTION INTRAMUSCULAR; INTRAVENOUS
Status: DISPENSED
Start: 2024-06-14

## (undated) RX ORDER — FENTANYL CITRATE 50 UG/ML
INJECTION, SOLUTION INTRAMUSCULAR; INTRAVENOUS
Status: DISPENSED
Start: 2024-06-14